# Patient Record
Sex: MALE | Race: WHITE | NOT HISPANIC OR LATINO | URBAN - METROPOLITAN AREA
[De-identification: names, ages, dates, MRNs, and addresses within clinical notes are randomized per-mention and may not be internally consistent; named-entity substitution may affect disease eponyms.]

---

## 2020-09-05 ENCOUNTER — EMERGENCY (EMERGENCY)
Facility: HOSPITAL | Age: 40
LOS: 1 days | Discharge: HOME | End: 2020-09-05
Attending: EMERGENCY MEDICINE
Payer: COMMERCIAL

## 2020-09-05 VITALS
HEART RATE: 113 BPM | WEIGHT: 235.01 LBS | DIASTOLIC BLOOD PRESSURE: 91 MMHG | TEMPERATURE: 98 F | OXYGEN SATURATION: 98 % | SYSTOLIC BLOOD PRESSURE: 136 MMHG | HEIGHT: 72 IN | RESPIRATION RATE: 18 BRPM

## 2020-09-05 VITALS — HEART RATE: 94 BPM

## 2020-09-05 PROCEDURE — 73630 X-RAY EXAM OF FOOT: CPT | Mod: 26,RT

## 2020-09-05 PROCEDURE — 99283 EMERGENCY DEPT VISIT LOW MDM: CPT

## 2020-09-05 PROCEDURE — 73630 X-RAY EXAM OF FOOT: CPT

## 2020-09-05 RX ORDER — CIPROFLOXACIN LACTATE 400MG/40ML
500 VIAL (ML) INTRAVENOUS ONCE
Refills: 0 | Status: COMPLETED | OUTPATIENT
Start: 2020-09-05 | End: 2020-09-05

## 2020-09-05 RX ORDER — IBUPROFEN 200 MG
600 TABLET ORAL ONCE
Refills: 0 | Status: COMPLETED | OUTPATIENT
Start: 2020-09-05 | End: 2020-09-05

## 2020-09-05 RX ORDER — CIPROFLOXACIN LACTATE 400MG/40ML
1 VIAL (ML) INTRAVENOUS
Qty: 14 | Refills: 0
Start: 2020-09-05 | End: 2020-09-11

## 2020-09-05 RX ORDER — IBUPROFEN 200 MG
1 TABLET ORAL
Qty: 15 | Refills: 0
Start: 2020-09-05

## 2020-09-05 RX ADMIN — Medication 600 MILLIGRAM(S): at 22:04

## 2020-09-05 RX ADMIN — Medication 500 MILLIGRAM(S): at 22:04

## 2020-09-05 NOTE — ED PROVIDER NOTE - CARE PROVIDER_API CALL
Tere, Peter J  PODIATRIC MEDICINE AND SURGERY  [address] [place] [address]  Phone: [phone]  Fax: [phone]  Follow Up Time:

## 2020-09-05 NOTE — ED PROVIDER NOTE - OBJECTIVE STATEMENT
39 y.o male Pmh of Dm II on Po medication and HTN presents in ER and C.o right foot pain and redness since 1-2 days ago . states he was working in [employer] and he had wet boot . he is not sure if he step on the nail or due to wet boot  on the sole of the foot.  as the day passed he had some redness around the toes . pain on walking . he took Motrin and one dose of Augmentin at home . denies any fever of chills, . states his sugar under control around 100. states last tetanus was 8 months ago

## 2020-09-05 NOTE — ED ADULT TRIAGE NOTE - CHIEF COMPLAINT QUOTE
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

## 2020-09-05 NOTE — ED PROVIDER NOTE - PROGRESS NOTE DETAILS
xray w.o any acute finding . draw the line at the cellulitis area . start the pt on cipro  f.u pcp and podiatry

## 2020-09-05 NOTE — ED PROVIDER NOTE - ATTENDING CONTRIBUTION TO CARE
Lyn: I performed a face to face bedside interview with patient regarding history of present illness, review of symptoms and past medical history. I completed an independent physical exam.  I have discussed patient's plan of care with advanced care provider.   I agree with note as stated above including HISTORY OF PRESENT ILLNESS, HIV, PAST MEDICAL/SURGICAL/FAMILY/SOCIAL HISTORY, ALLERGIES AND HOME MEDICATIONS, REVIEW OF SYSTEMS, PHYSICAL EXAM, MEDICAL DECISION MAKING and any PROGRESS NOTES during the time I functioned as the attending physician for this patient  unless otherwise noted. My brief assessment is as follows: pt with 1 day right plantar foot pain near distal middle foot. 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 denies f/c, n/v or any other symptoms. on metformin for dm. took one dose augmentin PTA. with small area of likely puncture wound distal foot, minimal surrounding erythema <1cm, ttp. no swelling, neurovasuclarly intact. will treat with cipro given possible nail puncture, return precautions.

## 2020-09-05 NOTE — ED PROVIDER NOTE - MUSCULOSKELETAL, MLM
Spine appears normal, right foot: no bony TTP , no puncture wound noted , on the sole of the foot mid ant foot point TTP , redness at ant foot around the 3,4,5 proximal metatarsal noted , no wound , no Strike redness noted

## 2020-09-05 NOTE — ED PROVIDER NOTE - CLINICAL SUMMARY MEDICAL DECISION MAKING FREE TEXT BOX
39 y.o male Pmh of Dm II on Po medication and HTN presents in ER and C.o right foot pain and redness since 1-2 days ago S.p work with wet boot or ? step on the nail   xray of the foot / TORI- cipro , motrin reeval

## 2020-09-05 NOTE — ED PROVIDER NOTE - PATIENT PORTAL LINK FT
You can access the FollowMyHealth Patient Portal offered by [facility] by registering at the following website: [email or web address]. By joining [facility]’s FollowMyHealth portal, you will also be able to view your health information using other applications (apps) compatible with our system.

## 2020-09-08 ENCOUNTER — EMERGENCY (EMERGENCY)
Facility: HOSPITAL | Age: 40
LOS: 1 days | Discharge: HOME | End: 2020-09-08
Attending: EMERGENCY MEDICINE
Payer: COMMERCIAL

## 2020-09-08 VITALS
HEIGHT: 72 IN | RESPIRATION RATE: 17 BRPM | HEART RATE: 111 BPM | DIASTOLIC BLOOD PRESSURE: 107 MMHG | SYSTOLIC BLOOD PRESSURE: 150 MMHG | TEMPERATURE: 98 F | WEIGHT: 233.91 LBS | OXYGEN SATURATION: 98 %

## 2020-09-08 PROCEDURE — 99283 EMERGENCY DEPT VISIT LOW MDM: CPT

## 2020-09-08 RX ORDER — AZTREONAM 2 G
1 VIAL (EA) INJECTION
Qty: 14 | Refills: 0
Start: 2020-09-08 | End: 2020-09-14

## 2020-09-08 RX ORDER — CEPHALEXIN 500 MG
1 CAPSULE ORAL
Qty: 28 | Refills: 0
Start: 2020-09-08 | End: 2020-09-14

## 2020-09-08 RX ORDER — CEPHALEXIN 500 MG
500 CAPSULE ORAL ONCE
Refills: 0 | Status: COMPLETED | OUTPATIENT
Start: 2020-09-08 | End: 2020-09-08

## 2020-09-08 RX ADMIN — Medication 500 MILLIGRAM(S): at 02:12

## 2020-09-08 RX ADMIN — Medication 1 TABLET(S): at 02:13

## 2020-09-08 NOTE — ED POST DISCHARGE NOTE - RESULT SUMMARY
as per patient never received antibiotics, as per chart RX Keflex and Bactrim sent, informed patient will re-sent medication, if pharmacy does not receive to contact ED directly to speak to pharmacist

## 2020-09-08 NOTE — ED PROVIDER NOTE - ATTENDING CONTRIBUTION TO CARE
I personally saw the patient with the PA, and completed the key components of the history and physical exam. I then discussed the management plan with the PA.   gen in nad resp clear cardiac no murmur abd soft msk skin as doceuemnted by pa   agree with abx switch neurovasc intact   areturn precautions given

## 2020-09-08 NOTE — ED PROVIDER NOTE - CLINICAL SUMMARY MEDICAL DECISION MAKING FREE TEXT BOX
afebrile, will have patient stop cipro, coverage keflex/bactrim for cellulitis, explained to pt if sxs worsen/fevers return to the ed immediately wound check in 48 hours by pmd

## 2020-09-08 NOTE — ED PROVIDER NOTE - OBJECTIVE STATEMENT
pt is a 40 y/o male with a pmhx of diabetes presenting to the ed for evaluation. pt was seen at [facility] two days ago fot right foot pain and redness, pt had puncture wound to bottom of the foot (unsure if nail or different object), then 2 days later noticed redness to top of the foot. pt was prescribed ciprofloxacin. pt looked today at the area and believed that it was spreading prompting visit. pt denies fevers. pt denies any new injuries or trauma. pt denies cp, SOB, abd pain,nausea, vomiting, back pain, numbness or loss of sensation

## 2020-09-08 NOTE — ED ADULT TRIAGE NOTE - CHIEF COMPLAINT QUOTE
PT presents to ED for redness pain and swelling to right foot.  PT states he stepped on something at work that punctured his foot. Seen here saturday DC's with cipro. Redness spreading up top of foot. Pain worsening

## 2020-09-08 NOTE — ED PROVIDER NOTE - PHYSICAL EXAMINATION
Const: Awake, alert and oriented. In no acute distress. Well appearing.  HEENT: NC/AT. Moist mucous membranes.  Eyes: No scleral icterus. EOMI.  Neck:. Soft and supple. Full ROM without pain.  Cardiac: +S1/S2. No murmurs. Peripheral pulses 2+ and symmetric. No LE edema.  Resp: Speaking in full sentences. No evidence of respiratory distress. No wheezes, rales or rhonchi.  Abd: Soft, non-tender, non-distended. Normal bowel sounds in all 4 quadrants. No guarding or rebound.  MSK: FROM in all extremities, neurovascularly intact, DP palpable   Back: Spine midline and non-tender. No CVAT.  Skin: small area of erythema/swelling noted to anterior foot around 3rd, 4th, 5th proximal metartarsal, line drawn from previous visit, small area outside of line, no linear streaking, no signs of nec fas noted soft compartments   Lymph: No cervical lymphadenopathy.  Neuro: Awake, alert & oriented x 3. Moves all extremities symmetrically.

## 2020-09-08 NOTE — ED PROVIDER NOTE - PATIENT PORTAL LINK FT
You can access the FollowMyHealth Patient Portal offered by [facility] by registering at the following website: [email or web address]. By joining [facility]’s FollowMyHealth portal, you will also be able to view your health information using other applications (apps) compatible with our system.

## 2020-09-09 ENCOUNTER — INPATIENT (INPATIENT)
Facility: HOSPITAL | Age: 40
LOS: 5 days | Discharge: HOME | DRG: 854 | End: 2020-09-15
Attending: STUDENT IN AN ORGANIZED HEALTH CARE EDUCATION/TRAINING PROGRAM | Admitting: FAMILY MEDICINE
Payer: COMMERCIAL

## 2020-09-09 VITALS
TEMPERATURE: 98 F | RESPIRATION RATE: 20 BRPM | OXYGEN SATURATION: 99 % | SYSTOLIC BLOOD PRESSURE: 174 MMHG | HEART RATE: 119 BPM | DIASTOLIC BLOOD PRESSURE: 111 MMHG | HEIGHT: 72 IN | WEIGHT: 227.96 LBS

## 2020-09-09 DIAGNOSIS — A41.9 SEPSIS, UNSPECIFIED ORGANISM: ICD-10-CM

## 2020-09-09 DIAGNOSIS — L03.818 CELLULITIS OF OTHER SITES: ICD-10-CM

## 2020-09-09 DIAGNOSIS — Z90.89 ACQUIRED ABSENCE OF OTHER ORGANS: Chronic | ICD-10-CM

## 2020-09-09 DIAGNOSIS — L03.119 CELLULITIS OF UNSPECIFIED PART OF LIMB: ICD-10-CM

## 2020-09-09 DIAGNOSIS — E11.65 TYPE 2 DIABETES MELLITUS WITH HYPERGLYCEMIA: ICD-10-CM

## 2020-09-09 LAB
ALBUMIN SERPL ELPH-MCNC: 4.4 G/DL — SIGNIFICANT CHANGE UP (ref 3.3–5.2)
ALP SERPL-CCNC: 99 U/L — SIGNIFICANT CHANGE UP (ref 40–120)
ALT FLD-CCNC: 21 U/L — SIGNIFICANT CHANGE UP
ANION GAP SERPL CALC-SCNC: 18 MMOL/L — HIGH (ref 5–17)
APTT BLD: 30.7 SEC — SIGNIFICANT CHANGE UP (ref 27.5–35.5)
AST SERPL-CCNC: 31 U/L — SIGNIFICANT CHANGE UP
BASOPHILS # BLD AUTO: 0.04 K/UL — SIGNIFICANT CHANGE UP (ref 0–0.2)
BASOPHILS NFR BLD AUTO: 0.2 % — SIGNIFICANT CHANGE UP (ref 0–2)
BILIRUB SERPL-MCNC: 0.6 MG/DL — SIGNIFICANT CHANGE UP (ref 0.4–2)
BUN SERPL-MCNC: 11 MG/DL — SIGNIFICANT CHANGE UP (ref 8–20)
CALCIUM SERPL-MCNC: 9.7 MG/DL — SIGNIFICANT CHANGE UP (ref 8.6–10.2)
CHLORIDE SERPL-SCNC: 93 MMOL/L — LOW (ref 98–107)
CO2 SERPL-SCNC: 23 MMOL/L — SIGNIFICANT CHANGE UP (ref 22–29)
CREAT SERPL-MCNC: 0.76 MG/DL — SIGNIFICANT CHANGE UP (ref 0.5–1.3)
EOSINOPHIL # BLD AUTO: 0.04 K/UL — SIGNIFICANT CHANGE UP (ref 0–0.5)
EOSINOPHIL NFR BLD AUTO: 0.2 % — SIGNIFICANT CHANGE UP (ref 0–6)
GLUCOSE BLDC GLUCOMTR-MCNC: 286 MG/DL — HIGH (ref 70–99)
GLUCOSE SERPL-MCNC: 341 MG/DL — HIGH (ref 70–99)
HCT VFR BLD CALC: 46.7 % — SIGNIFICANT CHANGE UP (ref 39–50)
HGB BLD-MCNC: 16 G/DL — SIGNIFICANT CHANGE UP (ref 13–17)
IMM GRANULOCYTES NFR BLD AUTO: 0.4 % — SIGNIFICANT CHANGE UP (ref 0–1.5)
INR BLD: 0.98 RATIO — SIGNIFICANT CHANGE UP (ref 0.88–1.16)
LACTATE BLDV-MCNC: 1.7 MMOL/L — SIGNIFICANT CHANGE UP (ref 0.5–2)
LYMPHOCYTES # BLD AUTO: 12.7 % — LOW (ref 13–44)
LYMPHOCYTES # BLD AUTO: 2.09 K/UL — SIGNIFICANT CHANGE UP (ref 1–3.3)
MCHC RBC-ENTMCNC: 30 PG — SIGNIFICANT CHANGE UP (ref 27–34)
MCHC RBC-ENTMCNC: 34.3 GM/DL — SIGNIFICANT CHANGE UP (ref 32–36)
MCV RBC AUTO: 87.6 FL — SIGNIFICANT CHANGE UP (ref 80–100)
MONOCYTES # BLD AUTO: 0.97 K/UL — HIGH (ref 0–0.9)
MONOCYTES NFR BLD AUTO: 5.9 % — SIGNIFICANT CHANGE UP (ref 2–14)
NEUTROPHILS # BLD AUTO: 13.21 K/UL — HIGH (ref 1.8–7.4)
NEUTROPHILS NFR BLD AUTO: 80.6 % — HIGH (ref 43–77)
PLATELET # BLD AUTO: 228 K/UL — SIGNIFICANT CHANGE UP (ref 150–400)
POTASSIUM SERPL-MCNC: 3.5 MMOL/L — SIGNIFICANT CHANGE UP (ref 3.5–5.3)
POTASSIUM SERPL-SCNC: 3.5 MMOL/L — SIGNIFICANT CHANGE UP (ref 3.5–5.3)
PROT SERPL-MCNC: 8.4 G/DL — SIGNIFICANT CHANGE UP (ref 6.6–8.7)
PROTHROM AB SERPL-ACNC: 11.4 SEC — SIGNIFICANT CHANGE UP (ref 10.6–13.6)
RBC # BLD: 5.33 M/UL — SIGNIFICANT CHANGE UP (ref 4.2–5.8)
RBC # FLD: 11.8 % — SIGNIFICANT CHANGE UP (ref 10.3–14.5)
SARS-COV-2 RNA SPEC QL NAA+PROBE: SIGNIFICANT CHANGE UP
SODIUM SERPL-SCNC: 134 MMOL/L — LOW (ref 135–145)
WBC # BLD: 16.41 K/UL — HIGH (ref 3.8–10.5)
WBC # FLD AUTO: 16.41 K/UL — HIGH (ref 3.8–10.5)

## 2020-09-09 PROCEDURE — 93971 EXTREMITY STUDY: CPT | Mod: 26,RT

## 2020-09-09 PROCEDURE — 99285 EMERGENCY DEPT VISIT HI MDM: CPT

## 2020-09-09 PROCEDURE — 93010 ELECTROCARDIOGRAM REPORT: CPT

## 2020-09-09 PROCEDURE — 73630 X-RAY EXAM OF FOOT: CPT | Mod: 26,RT

## 2020-09-09 RX ORDER — DEXTROSE 50 % IN WATER 50 %
25 SYRINGE (ML) INTRAVENOUS ONCE
Refills: 0 | Status: DISCONTINUED | OUTPATIENT
Start: 2020-09-09 | End: 2020-09-15

## 2020-09-09 RX ORDER — PIPERACILLIN AND TAZOBACTAM 4; .5 G/20ML; G/20ML
3.38 INJECTION, POWDER, LYOPHILIZED, FOR SOLUTION INTRAVENOUS EVERY 8 HOURS
Refills: 0 | Status: DISCONTINUED | OUTPATIENT
Start: 2020-09-09 | End: 2020-09-15

## 2020-09-09 RX ORDER — ACETAMINOPHEN 500 MG
650 TABLET ORAL ONCE
Refills: 0 | Status: COMPLETED | OUTPATIENT
Start: 2020-09-09 | End: 2020-09-09

## 2020-09-09 RX ORDER — ACETAMINOPHEN 500 MG
650 TABLET ORAL EVERY 6 HOURS
Refills: 0 | Status: DISCONTINUED | OUTPATIENT
Start: 2020-09-09 | End: 2020-09-15

## 2020-09-09 RX ORDER — GLUCAGON INJECTION, SOLUTION 0.5 MG/.1ML
1 INJECTION, SOLUTION SUBCUTANEOUS ONCE
Refills: 0 | Status: DISCONTINUED | OUTPATIENT
Start: 2020-09-09 | End: 2020-09-15

## 2020-09-09 RX ORDER — OXYCODONE HYDROCHLORIDE 5 MG/1
5 TABLET ORAL EVERY 12 HOURS
Refills: 0 | Status: DISCONTINUED | OUTPATIENT
Start: 2020-09-09 | End: 2020-09-14

## 2020-09-09 RX ORDER — OXYCODONE HYDROCHLORIDE 5 MG/1
5 TABLET ORAL ONCE
Refills: 0 | Status: DISCONTINUED | OUTPATIENT
Start: 2020-09-09 | End: 2020-09-09

## 2020-09-09 RX ORDER — ENOXAPARIN SODIUM 100 MG/ML
40 INJECTION SUBCUTANEOUS DAILY
Refills: 0 | Status: DISCONTINUED | OUTPATIENT
Start: 2020-09-09 | End: 2020-09-15

## 2020-09-09 RX ORDER — VANCOMYCIN HCL 1 G
1250 VIAL (EA) INTRAVENOUS EVERY 12 HOURS
Refills: 0 | Status: DISCONTINUED | OUTPATIENT
Start: 2020-09-09 | End: 2020-09-09

## 2020-09-09 RX ORDER — INSULIN LISPRO 100/ML
VIAL (ML) SUBCUTANEOUS AT BEDTIME
Refills: 0 | Status: DISCONTINUED | OUTPATIENT
Start: 2020-09-09 | End: 2020-09-15

## 2020-09-09 RX ORDER — SODIUM CHLORIDE 9 MG/ML
2400 INJECTION INTRAMUSCULAR; INTRAVENOUS; SUBCUTANEOUS ONCE
Refills: 0 | Status: COMPLETED | OUTPATIENT
Start: 2020-09-09 | End: 2020-09-09

## 2020-09-09 RX ORDER — PIPERACILLIN AND TAZOBACTAM 4; .5 G/20ML; G/20ML
3.38 INJECTION, POWDER, LYOPHILIZED, FOR SOLUTION INTRAVENOUS ONCE
Refills: 0 | Status: COMPLETED | OUTPATIENT
Start: 2020-09-09 | End: 2020-09-09

## 2020-09-09 RX ORDER — SODIUM CHLORIDE 9 MG/ML
1000 INJECTION, SOLUTION INTRAVENOUS
Refills: 0 | Status: DISCONTINUED | OUTPATIENT
Start: 2020-09-09 | End: 2020-09-15

## 2020-09-09 RX ORDER — VANCOMYCIN HCL 1 G
1000 VIAL (EA) INTRAVENOUS EVERY 8 HOURS
Refills: 0 | Status: DISCONTINUED | OUTPATIENT
Start: 2020-09-09 | End: 2020-09-10

## 2020-09-09 RX ORDER — SODIUM CHLORIDE 9 MG/ML
1000 INJECTION INTRAMUSCULAR; INTRAVENOUS; SUBCUTANEOUS
Refills: 0 | Status: COMPLETED | OUTPATIENT
Start: 2020-09-09 | End: 2020-09-09

## 2020-09-09 RX ORDER — LACTOBACILLUS ACIDOPHILUS 100MM CELL
1 CAPSULE ORAL
Refills: 0 | Status: DISCONTINUED | OUTPATIENT
Start: 2020-09-09 | End: 2020-09-10

## 2020-09-09 RX ORDER — VANCOMYCIN HCL 1 G
1000 VIAL (EA) INTRAVENOUS ONCE
Refills: 0 | Status: COMPLETED | OUTPATIENT
Start: 2020-09-09 | End: 2020-09-09

## 2020-09-09 RX ORDER — DEXTROSE 50 % IN WATER 50 %
15 SYRINGE (ML) INTRAVENOUS ONCE
Refills: 0 | Status: DISCONTINUED | OUTPATIENT
Start: 2020-09-09 | End: 2020-09-15

## 2020-09-09 RX ORDER — DEXTROSE 50 % IN WATER 50 %
12.5 SYRINGE (ML) INTRAVENOUS ONCE
Refills: 0 | Status: DISCONTINUED | OUTPATIENT
Start: 2020-09-09 | End: 2020-09-15

## 2020-09-09 RX ORDER — INSULIN LISPRO 100/ML
VIAL (ML) SUBCUTANEOUS
Refills: 0 | Status: DISCONTINUED | OUTPATIENT
Start: 2020-09-09 | End: 2020-09-15

## 2020-09-09 RX ADMIN — Medication 250 MILLIGRAM(S): at 19:01

## 2020-09-09 RX ADMIN — SODIUM CHLORIDE 2400 MILLILITER(S): 9 INJECTION INTRAMUSCULAR; INTRAVENOUS; SUBCUTANEOUS at 16:07

## 2020-09-09 RX ADMIN — SODIUM CHLORIDE 125 MILLILITER(S): 9 INJECTION INTRAMUSCULAR; INTRAVENOUS; SUBCUTANEOUS at 21:43

## 2020-09-09 RX ADMIN — SODIUM CHLORIDE 2400 MILLILITER(S): 9 INJECTION INTRAMUSCULAR; INTRAVENOUS; SUBCUTANEOUS at 17:10

## 2020-09-09 RX ADMIN — OXYCODONE HYDROCHLORIDE 5 MILLIGRAM(S): 5 TABLET ORAL at 20:25

## 2020-09-09 RX ADMIN — Medication 650 MILLIGRAM(S): at 17:12

## 2020-09-09 RX ADMIN — PIPERACILLIN AND TAZOBACTAM 25 GRAM(S): 4; .5 INJECTION, POWDER, LYOPHILIZED, FOR SOLUTION INTRAVENOUS at 21:43

## 2020-09-09 RX ADMIN — PIPERACILLIN AND TAZOBACTAM 200 GRAM(S): 4; .5 INJECTION, POWDER, LYOPHILIZED, FOR SOLUTION INTRAVENOUS at 17:20

## 2020-09-09 NOTE — ED ADULT NURSE NOTE - INTERVENTIONS DEFINITIONS
[place] to call system/Stretcher in lowest position, wheels locked, appropriate side rails in place/Monitor gait and stability/Review medications for side effects contributing to fall risk/Monitor for mental status changes and reorient to person, place, and time

## 2020-09-09 NOTE — H&P ADULT - NSHPPHYSICALEXAM_GEN_ALL_CORE
General: Well developed,  male lying in bed not in distress.  HEENT: AT, NC. PERRL. intact EOM. no throat erythema or exudate.   Neck: supple. no JVD.   Chest: CTA bilaterally  Heart: S1,S2. RRR. no heart murmur. no edema  Abdomen: soft. non-tender. non-distended. + BS.   Ext: no calf tenderness on either side. distal pulses 2 +, sensory intact.   Neuro: AAO x3. no focal weakness. no speech disorder. all CNs intact, reflexes 2 + b/L.   Skin: rt. foot dorsal aspect has an erythematous , swollen, warm and tender area extending from 3rd to 5th toe towards lower ankle area covering lateral half of the rt. foot. no fluctuation, no open wound. over plantar aspect no open wound noted, no discharge.   Psych : normal affect.

## 2020-09-09 NOTE — ED ADULT NURSE NOTE - OBJECTIVE STATEMENT
Assumed pt care @1700. Pt received A&Ox3 c/o R foot pain that began Assumed pt care @1700. JASMYN Faye @ bedside. Pt received A&Ox3 c/o R foot pain that began Friday and R lower toothache that began last night. Pt states he stepped on an unknown object in R foot approx a week ago, no puncture wound present. As per patient, tetanus up to date. Pt c/o of chills and 6/10 pain at this time. Pt states he came to ED Friday night and was started on Cipro. Pt states he was compliant w/antibiotics regimen. Pt states the foot pain began to worsen and swelling increased. Pt came back to ED on Sunday and switched to two different antibiotics, unsure of names. Pt states pain became unbearable and chills began last night. Pt placed on  bpm in sinus tach w/. Breathing even and unlabored. NAD. Pt given pain meds as per MD order. Patient given call bell and made aware of plan of care.

## 2020-09-09 NOTE — ED ADULT TRIAGE NOTE - CHIEF COMPLAINT QUOTE
Pt arrives with c/o R foot cellulitis that is increasing past border and was told to come back if so. Pt c/o chills/fevers. Pt also with R lower toothache. Pt medicated with IBU PTA.

## 2020-09-09 NOTE — ED PROVIDER NOTE - OBJECTIVE STATEMENT
38 y/o M with PMHx DM presents to ED c/o right foot cellulitis that is worsening over past 6 days. Pt was seen at [facility] ER twice over past several days, was initially Rx'd Cipro which was then changed to Bactrim/Keflex which he reports compliance with. Pt states cellulitic area is worsening. Reports chills this morning, took motrin 4 hours prior to arrival. Also reports R sided toothache. Pt has healing abrasions to RLE from scrapes at his job.   Last known TDAP within 1 year  Soc: Former smoker, denies IVDU

## 2020-09-09 NOTE — ED PROVIDER NOTE - CLINICAL SUMMARY MEDICAL DECISION MAKING FREE TEXT BOX
38 y/o M with PMHx DM presents to ED c/o right foot cellulitis that is worsening over past 6 days.  -Will check XR foot, US duplex, labs, abx and tylenol for pain.

## 2020-09-09 NOTE — H&P ADULT - HISTORY OF PRESENT ILLNESS
pt. is a 38 y/o male with h/o DM presents to ER for rt. foot pain, redness and warmth that is worsening over past 6 -7  days. Pt was seen at [facility] ER twice over past several days, was initially given Cipro which was then changed to Bactrim/Keflex which he has used but did not get better and cellulitic area is worsening . Reports chills this morning, took motrin 4 hours prior to arrival. 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  no cp, no sob, no abd. pain.  no n/v/d. Last known TDAP within 1 year

## 2020-09-09 NOTE — H&P ADULT - PROBLEM SELECTOR PLAN 2
will keep on humalog scale, close f/u on BG. wbc [id number], Hr 119, obvious source is foot cellulitis, vanco, zosyn on board, follow cx, iv fluids.

## 2020-09-09 NOTE — H&P ADULT - PROBLEM SELECTOR PROBLEM 2
Type 2 diabetes mellitus with hyperglycemia, without long-term current use of insulin Sepsis without acute organ dysfunction, due to unspecified organism

## 2020-09-09 NOTE — ED PROVIDER NOTE - ATTENDING CONTRIBUTION TO CARE
I, Elias Roach, have personally performed a face to face diagnostic evaluation on this patient. I have reviewed the ACP note and agree with the history, exam and plan of care, except as noted.    38 yo M hx DM and right foot cellulitis. patient was on multiple abx and kept switching over. symptoms not improving. Right foot cellulitis with tracking up the leg and edema. xray negative for free air. ID consulted, agree with rivas and zosyn. Patient admitted for foot cellulitis.

## 2020-09-09 NOTE — ED PROVIDER NOTE - PROGRESS NOTE DETAILS
JASMYN Faye NOTE: I spoke to ID Dr. Cody who recommends Vanco, Zosyn and admission. I also spoke to podiatry resident who will come and evaluate the patient. JASMYN Faye NOTE: Pt agreeable to admission. Medicine team accepted admission, all further care and disposition transferred to medicine team.

## 2020-09-10 LAB
A1C WITH ESTIMATED AVERAGE GLUCOSE RESULT: 12.6 % — HIGH (ref 4–5.6)
ANION GAP SERPL CALC-SCNC: 14 MMOL/L — SIGNIFICANT CHANGE UP (ref 5–17)
BASOPHILS # BLD AUTO: 0.06 K/UL — SIGNIFICANT CHANGE UP (ref 0–0.2)
BASOPHILS NFR BLD AUTO: 0.5 % — SIGNIFICANT CHANGE UP (ref 0–2)
BUN SERPL-MCNC: 9 MG/DL — SIGNIFICANT CHANGE UP (ref 8–20)
CALCIUM SERPL-MCNC: 9 MG/DL — SIGNIFICANT CHANGE UP (ref 8.6–10.2)
CHLORIDE SERPL-SCNC: 101 MMOL/L — SIGNIFICANT CHANGE UP (ref 98–107)
CO2 SERPL-SCNC: 22 MMOL/L — SIGNIFICANT CHANGE UP (ref 22–29)
CREAT SERPL-MCNC: 0.55 MG/DL — SIGNIFICANT CHANGE UP (ref 0.5–1.3)
EOSINOPHIL # BLD AUTO: 0.12 K/UL — SIGNIFICANT CHANGE UP (ref 0–0.5)
EOSINOPHIL NFR BLD AUTO: 0.9 % — SIGNIFICANT CHANGE UP (ref 0–6)
ESTIMATED AVERAGE GLUCOSE: 315 MG/DL — HIGH (ref 68–114)
GLUCOSE BLDC GLUCOMTR-MCNC: 144 MG/DL — HIGH (ref 70–99)
GLUCOSE BLDC GLUCOMTR-MCNC: 162 MG/DL — HIGH (ref 70–99)
GLUCOSE BLDC GLUCOMTR-MCNC: 180 MG/DL — HIGH (ref 70–99)
GLUCOSE BLDC GLUCOMTR-MCNC: 211 MG/DL — HIGH (ref 70–99)
GLUCOSE SERPL-MCNC: 186 MG/DL — HIGH (ref 70–99)
HCT VFR BLD CALC: 42.4 % — SIGNIFICANT CHANGE UP (ref 39–50)
HGB BLD-MCNC: 14.5 G/DL — SIGNIFICANT CHANGE UP (ref 13–17)
IMM GRANULOCYTES NFR BLD AUTO: 0.2 % — SIGNIFICANT CHANGE UP (ref 0–1.5)
LYMPHOCYTES # BLD AUTO: 1.68 K/UL — SIGNIFICANT CHANGE UP (ref 1–3.3)
LYMPHOCYTES # BLD AUTO: 13 % — SIGNIFICANT CHANGE UP (ref 13–44)
MCHC RBC-ENTMCNC: 29.9 PG — SIGNIFICANT CHANGE UP (ref 27–34)
MCHC RBC-ENTMCNC: 34.2 GM/DL — SIGNIFICANT CHANGE UP (ref 32–36)
MCV RBC AUTO: 87.4 FL — SIGNIFICANT CHANGE UP (ref 80–100)
MONOCYTES # BLD AUTO: 0.93 K/UL — HIGH (ref 0–0.9)
MONOCYTES NFR BLD AUTO: 7.2 % — SIGNIFICANT CHANGE UP (ref 2–14)
NEUTROPHILS # BLD AUTO: 10.11 K/UL — HIGH (ref 1.8–7.4)
NEUTROPHILS NFR BLD AUTO: 78.2 % — HIGH (ref 43–77)
PLATELET # BLD AUTO: 219 K/UL — SIGNIFICANT CHANGE UP (ref 150–400)
POTASSIUM SERPL-MCNC: 3.7 MMOL/L — SIGNIFICANT CHANGE UP (ref 3.5–5.3)
POTASSIUM SERPL-SCNC: 3.7 MMOL/L — SIGNIFICANT CHANGE UP (ref 3.5–5.3)
RBC # BLD: 4.85 M/UL — SIGNIFICANT CHANGE UP (ref 4.2–5.8)
RBC # FLD: 11.8 % — SIGNIFICANT CHANGE UP (ref 10.3–14.5)
SODIUM SERPL-SCNC: 137 MMOL/L — SIGNIFICANT CHANGE UP (ref 135–145)
VANCOMYCIN TROUGH SERPL-MCNC: 4.7 UG/ML — LOW (ref 10–20)
WBC # BLD: 12.93 K/UL — HIGH (ref 3.8–10.5)
WBC # FLD AUTO: 12.93 K/UL — HIGH (ref 3.8–10.5)

## 2020-09-10 PROCEDURE — 99232 SBSQ HOSP IP/OBS MODERATE 35: CPT

## 2020-09-10 PROCEDURE — 99233 SBSQ HOSP IP/OBS HIGH 50: CPT

## 2020-09-10 RX ORDER — INSULIN GLARGINE 100 [IU]/ML
10 INJECTION, SOLUTION SUBCUTANEOUS AT BEDTIME
Refills: 0 | Status: DISCONTINUED | OUTPATIENT
Start: 2020-09-10 | End: 2020-09-12

## 2020-09-10 RX ORDER — SACCHAROMYCES BOULARDII 250 MG
250 POWDER IN PACKET (EA) ORAL
Refills: 0 | Status: DISCONTINUED | OUTPATIENT
Start: 2020-09-10 | End: 2020-09-15

## 2020-09-10 RX ORDER — VANCOMYCIN HCL 1 G
1250 VIAL (EA) INTRAVENOUS EVERY 8 HOURS
Refills: 0 | Status: DISCONTINUED | OUTPATIENT
Start: 2020-09-10 | End: 2020-09-12

## 2020-09-10 RX ORDER — INSULIN GLARGINE 100 [IU]/ML
8 INJECTION, SOLUTION SUBCUTANEOUS AT BEDTIME
Refills: 0 | Status: DISCONTINUED | OUTPATIENT
Start: 2020-09-10 | End: 2020-09-10

## 2020-09-10 RX ORDER — INFLUENZA VIRUS VACCINE 15; 15; 15; 15 UG/.5ML; UG/.5ML; UG/.5ML; UG/.5ML
0.5 SUSPENSION INTRAMUSCULAR ONCE
Refills: 0 | Status: COMPLETED | OUTPATIENT
Start: 2020-09-10 | End: 2020-09-13

## 2020-09-10 RX ORDER — INSULIN LISPRO 100/ML
3 VIAL (ML) SUBCUTANEOUS
Refills: 0 | Status: DISCONTINUED | OUTPATIENT
Start: 2020-09-10 | End: 2020-09-12

## 2020-09-10 RX ADMIN — Medication 3 UNIT(S): at 16:21

## 2020-09-10 RX ADMIN — ENOXAPARIN SODIUM 40 MILLIGRAM(S): 100 INJECTION SUBCUTANEOUS at 11:21

## 2020-09-10 RX ADMIN — Medication 3 UNIT(S): at 12:12

## 2020-09-10 RX ADMIN — Medication 250 MILLIGRAM(S): at 16:22

## 2020-09-10 RX ADMIN — OXYCODONE HYDROCHLORIDE 5 MILLIGRAM(S): 5 TABLET ORAL at 06:38

## 2020-09-10 RX ADMIN — OXYCODONE HYDROCHLORIDE 5 MILLIGRAM(S): 5 TABLET ORAL at 07:08

## 2020-09-10 RX ADMIN — PIPERACILLIN AND TAZOBACTAM 25 GRAM(S): 4; .5 INJECTION, POWDER, LYOPHILIZED, FOR SOLUTION INTRAVENOUS at 13:34

## 2020-09-10 RX ADMIN — Medication 250 MILLIGRAM(S): at 02:41

## 2020-09-10 RX ADMIN — PIPERACILLIN AND TAZOBACTAM 25 GRAM(S): 4; .5 INJECTION, POWDER, LYOPHILIZED, FOR SOLUTION INTRAVENOUS at 22:31

## 2020-09-10 RX ADMIN — Medication 2: at 16:20

## 2020-09-10 RX ADMIN — OXYCODONE HYDROCHLORIDE 5 MILLIGRAM(S): 5 TABLET ORAL at 22:59

## 2020-09-10 RX ADMIN — Medication 650 MILLIGRAM(S): at 11:21

## 2020-09-10 RX ADMIN — OXYCODONE HYDROCHLORIDE 5 MILLIGRAM(S): 5 TABLET ORAL at 22:28

## 2020-09-10 RX ADMIN — Medication 166.67 MILLIGRAM(S): at 22:28

## 2020-09-10 RX ADMIN — Medication 4: at 07:47

## 2020-09-10 RX ADMIN — Medication 250 MILLIGRAM(S): at 11:21

## 2020-09-10 RX ADMIN — INSULIN GLARGINE 10 UNIT(S): 100 INJECTION, SOLUTION SUBCUTANEOUS at 22:28

## 2020-09-10 RX ADMIN — Medication 650 MILLIGRAM(S): at 12:13

## 2020-09-10 RX ADMIN — PIPERACILLIN AND TAZOBACTAM 25 GRAM(S): 4; .5 INJECTION, POWDER, LYOPHILIZED, FOR SOLUTION INTRAVENOUS at 06:36

## 2020-09-10 NOTE — CONSULT NOTE ADULT - SUBJECTIVE AND OBJECTIVE BOX
[facility]  INFECTIOUS DISEASES AND INTERNAL MEDICINE at [place]  =======================================================  Korey Del Valle MD  Diplomates American Board of Internal Medicine and Infectious Diseases  Tel  [phone]  Fax [phone]  =======================================================    [facility]-[id number]  GABRIEL SCHMIDT   HPI:  pt. is a 40 y/o male with DM, not compliant with therapy for several months, presents to ER for rt. foot pain, redness and warmth that is worsening over past 6 -7  days. Pt was seen at [facility] ER twice over past several days, was initially given Cipro which was then changed to Bactrim/Keflex which he has used but did not get better and cellulitic area is worsening . Reports chills this morning, took motrin 4 hours prior to arrival. 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  no cp, no sob, no abd. pain.  no n/v/d. Last known TDAP within 1 year (09 Sep 2020 19:30)    no clear puncture wound.    labs reviewed, A1c of 12.6,   Less pain in the right foot today    I have personally reviewed the labs and data; pertinent labs and data are listed in this note; please see below.   =======================================================  Past Medical & Surgical Hx:  =====================  PAST MEDICAL & SURGICAL HISTORY:  Diabetes  S/P tonsillectomy    Problem List:  ==========  HEALTH ISSUES - PROBLEM Dx:  Sepsis without acute organ dysfunction, due to unspecified organism: Sepsis without acute organ dysfunction, due to unspecified organism  Type 2 diabetes mellitus with hyperglycemia, without long-term current use of insulin: Type 2 diabetes mellitus with hyperglycemia, without long-term current use of insulin  Cellulitis of other specified site: Cellulitis of other specified site      Social Hx:  =======  marijuana cigarettes 3 times a week  former smoker, quit 11/2019    FAMILY HISTORY:  Family history of diabetes mellitus (DM): father  no significant family history of immunosuppressive disorders in mother or father   =======================================================  REVIEW OF SYSTEMS:  CONSTITUTIONAL:  No Fever or chills  HEENT:  No diplopia or blurred vision.  No earache, sore throat or runny nose.  CARDIOVASCULAR:  No pressure, squeezing, strangling, tightness, heaviness or aching about the chest, neck, axilla or epigastrium.  RESPIRATORY:  No cough, shortness of breath  GASTROINTESTINAL:  No nausea, vomiting or diarrhea.  GENITOURINARY:  No dysuria, frequency or urgency. No Blood in urine  MUSCULOSKELETAL:  no joint aches, no muscle pain  SKIN:  as per HPI  NEUROLOGIC:  No Headaches, seizures or weakness.  PSYCHIATRIC:  No disorder of thought or mood.  ENDOCRINE:  No heat or cold intolerance  HEMATOLOGICAL:  No easy bruising or bleeding.   =======================================================  Allergies  No Known Allergies      Antibiotics:  piperacillin/tazobactam IVPB.. 3.375 Gram(s) IV Intermittent every 8 hours  vancomycin  IVPB 1000 milliGRAM(s) IV Intermittent every 8 hours    Other medications:  dextrose 5%. 1000 milliLiter(s) IV Continuous <Continuous>  dextrose 50% Injectable 12.5 Gram(s) IV Push once  dextrose 50% Injectable 25 Gram(s) IV Push once  dextrose 50% Injectable 25 Gram(s) IV Push once  enoxaparin Injectable 40 milliGRAM(s) SubCutaneous daily  influenza   Vaccine 0.5 milliLiter(s) IntraMuscular once  insulin glargine Injectable (LANTUS) 8 Unit(s) SubCutaneous at bedtime  insulin lispro (HumaLOG) corrective regimen sliding scale   SubCutaneous three times a day before meals  insulin lispro (HumaLOG) corrective regimen sliding scale   SubCutaneous at bedtime  insulin lispro Injectable (HumaLOG) 3 Unit(s) SubCutaneous three times a day before meals  saccharomyces boulardii 250 milliGRAM(s) Oral two times a day     cephalexin   500 milliGRAM(s) Oral (09-08-20 @ 02:12)    ciprofloxacin     Tablet   500 milliGRAM(s) Oral (09-05-20 @ 22:04)    piperacillin/tazobactam IVPB.   200 mL/Hr IV Intermittent (09-09-20 @ 17:20)    piperacillin/tazobactam IVPB..   25 mL/Hr IV Intermittent (09-09-20 @ 21:43)   25 mL/Hr IV Intermittent (09-10-20 @ 06:36)    trimethoprim  160 mG/sulfamethoxazole 800 mG   1 Tablet(s) Oral (09-08-20 @ 02:13)    vancomycin  IVPB   250 mL/Hr IV Intermittent (09-09-20 @ 19:01)    vancomycin  IVPB   250 mL/Hr IV Intermittent (09-10-20 @ 02:41)      ======================================================  Physical Exam:  ============  T(F): 98.1 (10 Sep 2020 09:00), Max: 99 (09 Sep 2020 16:41)  HR: 98 (10 Sep 2020 09:00)  BP: 136/93 (10 Sep 2020 09:00)  RR: 18 (10 Sep 2020 09:00)  SpO2: 97% (10 Sep 2020 00:32) (97% - 99%)  temp max in last 48H T(F): , Max: 99 (09-09-20 @ 16:41)Height (cm): 182.88 (09-09-20 @ 14:18)  Weight (kg): 103.4 (09-09-20 @ 14:18)  BMI (kg/m2): 30.9 (09-09-20 @ 14:18)  BSA (m2): 2.25 (09-09-20 @ 14:18)    General:  No acute distress.  Eye: Pupils are equal, round and reactive to light, Extraocular movements are intact, Normal conjunctiva.  HENT: Normocephalic, Oral mucosa is moist, No pharyngeal erythema, No sinus tenderness.  Neck: Supple, No lymphadenopathy.  Respiratory: Lungs are clear to auscultation, Respirations are non-labored.  Cardiovascular: Normal rate, Regular rhythm,   Gastrointestinal: Soft, Non-tender, Non-distended, Normal bowel sounds.  Genitourinary: No costovertebral angle tenderness.  Lymphatics: No lymphadenopathy neck,   Musculoskeletal: Normal range of motion, Normal strength.  Integumentary:   RIGHT FOOT dorsum with erythema,  RIGHT #4 #5 toes, with macerated skin at flexor surface  Neurologic: Alert, Oriented, No focal deficits, Cranial Nerves II-XII are grossly intact.  Psychiatric: Appropriate mood & affect.    =======================================================  Labs:                        14.5   12.93 )-----------( 219      ( 10 Sep 2020 08:10 )             42.4      09-10    137  |  101  |  9.0  ----------------------------<  186<H>  3.7   |  22.0  |  0.55    Ca    9.0      10 Sep 2020 08:10    TPro  8.4  /  Alb  4.4  /  TBili  0.6  /  DBili  x   /  AST  31  /  ALT  21  /  AlkPhos  99  09-09      Creatinine, Serum: 0.55 mg/dL (09-10-20 @ 08:10)  Creatinine, Serum: 0.76 mg/dL (09-09-20 @ 17:49)            COVID-19 PCR: NotDetec (09-09-20 @ 19:23)      WBC Count: 12.93 K/uL (09-10-20 @ 08:10)  WBC Count: 16.41 K/uL (09-09-20 @ 17:49)

## 2020-09-10 NOTE — PROGRESS NOTE ADULT - ASSESSMENT
38 yo Male  with hx of DM on oral meds presented to the ED for worsening foot infection failed outpatient oral antibiotics.    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, has crack in between toes  XR unremarkable and no dvt on duplex  plan:  - continue broad spectrum abx Vanco and Zosyn  -  appreciated ID and Podiatry eval  - Podiatry has done bed side cleaning and sent cultures, will follow  - blood cx pending  - monitor cbc  - prn pain control Tylenol and oxycodone for foot pain    Leukocytosis due to sepsis and cellulitis    Uncontrolled DM type 2  on oral meds but not compliant  a1c 12%  - start lantus 10 units at bedtime and 3 units premeal, will adjust  - ISS and hypoglycemic protocol  - Diabetes educator    DVT ppx: Lovenox  Dispo: remain inpatient likely 2-3 days on IV abx pending cultures  Fullcode

## 2020-09-10 NOTE — ADVANCED PRACTICE NURSE CONSULT - RECOMMEDATIONS
continue diabetes self management education  pt to inject all inuslin doses using pre filled inuslin syringe and rn supervision  glucometer teaching  cc- pls set home care pt is new to insulin continue diabetes self management education  pt to inject all inuslin doses using pre filled inuslin syringe and rn supervision  glucometer teaching  pt is covered for lantus and humalog pens  e pt is new to insulin

## 2020-09-10 NOTE — CONSULT NOTE ADULT - SUBJECTIVE AND OBJECTIVE BOX
Patient is a 39y old  Male who presents with a chief complaint of rt. foot cellulitis (10 Sep 2020 12:18)      HPI:  pt. is a 40 y/o male with h/o DM presents to ER for rt. foot pain, redness and warmth that is worsening over past 6 -7  days. Pt was seen at [facility] ER twice over past several days, was initially given Cipro which was then changed to Bactrim/Keflex which he has used but did not get better and cellulitic area is worsening . Reports chills this morning, took motrin 4 hours prior to arrival. pt. works in [employer] business and thinks likely stepped on something about 1 week ago and later his foot got redness, warmth and swelling.  no cp, no sob, no abd. pain.  no n/v/d. Last known TDAP within 1 year (09 Sep 2020 19:30)      Podiatry HPI: History as above. 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 He was previous discharged from ED with oral antibiotics however the redness worsened with increased pain to the right foot. Patient does not follow a podiatrist outpatient. Patient endorses tingling sensation in toes. Denies f/c/n/v or SOB.     PMH: Diabetes    Allergies: No Known Allergies    Medications: acetaminophen   Tablet .. 650 milliGRAM(s) Oral every 6 hours PRN  dextrose 40% Gel 15 Gram(s) Oral once PRN  dextrose 5%. 1000 milliLiter(s) IV Continuous <Continuous>  dextrose 50% Injectable 12.5 Gram(s) IV Push once  dextrose 50% Injectable 25 Gram(s) IV Push once  dextrose 50% Injectable 25 Gram(s) IV Push once  enoxaparin Injectable 40 milliGRAM(s) SubCutaneous daily  glucagon  Injectable 1 milliGRAM(s) IntraMuscular once PRN  influenza   Vaccine 0.5 milliLiter(s) IntraMuscular once  insulin glargine Injectable (LANTUS) 10 Unit(s) SubCutaneous at bedtime  insulin lispro (HumaLOG) corrective regimen sliding scale   SubCutaneous three times a day before meals  insulin lispro (HumaLOG) corrective regimen sliding scale   SubCutaneous at bedtime  insulin lispro Injectable (HumaLOG) 3 Unit(s) SubCutaneous three times a day before meals  oxyCODONE    IR 5 milliGRAM(s) Oral every 12 hours PRN  piperacillin/tazobactam IVPB.. 3.375 Gram(s) IV Intermittent every 8 hours  saccharomyces boulardii 250 milliGRAM(s) Oral two times a day  vancomycin  IVPB 1000 milliGRAM(s) IV Intermittent every 8 hours    FH:Family history of diabetes mellitus (DM)    PSX: S/P tonsillectomy    SH: acetaminophen   Tablet .. 650 milliGRAM(s) Oral every 6 hours PRN  dextrose 40% Gel 15 Gram(s) Oral once PRN  dextrose 5%. 1000 milliLiter(s) IV Continuous <Continuous>  dextrose 50% Injectable 12.5 Gram(s) IV Push once  dextrose 50% Injectable 25 Gram(s) IV Push once  dextrose 50% Injectable 25 Gram(s) IV Push once  enoxaparin Injectable 40 milliGRAM(s) SubCutaneous daily  glucagon  Injectable 1 milliGRAM(s) IntraMuscular once PRN  influenza   Vaccine 0.5 milliLiter(s) IntraMuscular once  insulin glargine Injectable (LANTUS) 10 Unit(s) SubCutaneous at bedtime  insulin lispro (HumaLOG) corrective regimen sliding scale   SubCutaneous three times a day before meals  insulin lispro (HumaLOG) corrective regimen sliding scale   SubCutaneous at bedtime  insulin lispro Injectable (HumaLOG) 3 Unit(s) SubCutaneous three times a day before meals  oxyCODONE    IR 5 milliGRAM(s) Oral every 12 hours PRN  piperacillin/tazobactam IVPB.. 3.375 Gram(s) IV Intermittent every 8 hours  saccharomyces boulardii 250 milliGRAM(s) Oral two times a day  vancomycin  IVPB 1000 milliGRAM(s) IV Intermittent every 8 hours      Vital Signs Last 24 Hrs  T(C): 36.7 (10 Sep 2020 09:00), Max: 37.2 (09 Sep 2020 16:41)  T(F): 98.1 (10 Sep 2020 09:00), Max: 99 (09 Sep 2020 16:41)  HR: 98 (10 Sep 2020 09:00) (89 - 119)  BP: 136/93 (10 Sep 2020 09:00) (136/93 - 174/111)  BP(mean): --  RR: 18 (10 Sep 2020 09:00) (18 - 20)  SpO2: 97% (10 Sep 2020 00:32) (97% - 99%)    LABS                        14.5   12.93 )-----------( 219      ( 10 Sep 2020 08:10 )             42.4               09-10    137  |  101  |  9.0  ----------------------------<  186<H>  3.7   |  22.0  |  0.55    Ca    9.0      10 Sep 2020 08:10    TPro  8.4  /  Alb  4.4  /  TBili  0.6  /  DBili  x   /  AST  31  /  ALT  21  /  AlkPhos  99  09-09      ROS  REVIEW OF SYSTEMS:    CONSTITUTIONAL: No fever, weight loss, or fatigue  EYES: No eye pain, visual disturbances, or discharge  ENMT:  No difficulty hearing, tinnitus, vertigo; No sinus or throat pain  NECK: No pain or stiffness  RESPIRATORY: No cough, wheezing, chills or hemoptysis; No shortness of breath  CARDIOVASCULAR: No chest pain, palpitations, dizziness, or right foot swelling  GASTROINTESTINAL: No abdominal or epigastric pain. No nausea, vomiting, or hematemesis; No diarrhea or constipation.   GENITOURINARY: No dysuria, frequency, hematuria, or incontinence  NEUROLOGICAL: No headaches, memory loss, loss of strength, numbness, or tremors  SKIN: No itching, burning, rashes, redness to right foot  ENDOCRINE: No heat or cold intolerance; No hair loss  MUSCULOSKELETAL: No joint pain or swelling; No muscle, back, right foot pain  PSYCHIATRIC: No depression, anxiety, mood swings, or difficulty sleeping  HEME/LYMPH: No easy bruising, or bleeding gums  ALLERGY AND IMMUNOLOGIC: No hives or eczema      PHYSICAL EXAM  GEN: GABRIEL SCHMIDT is a pleasant well-nourished, well developed 39y Male in no acute distress, alert awake, and oriented to person, place and time.   LE Focused:    Vasc: DP/PT palpable, CFT brisk to digit. skin temperature warm to warm. Right foot warmer compared to left  Derm: Ertythema edema noted to the lateral dorsum of the foot. digits are edematous, 4th webspace maceration with open lesion that is superficial does not track or probe deep. No discharge or malodor. Small focal fluctuance noted plantarly, upon debridement, ~1cc of purulence expressed, ulcer measureing 1.5cm x 1cm x 0.1cm down to subcutanous tissue, granular base, no tracking or tunneling. Negative probe to bone. Small hyperkeratotic lesion noted plantar hallux. No other lesions or rash noted.   Neuro: diminished protective sensation.   MSK: Pain to palpation to the right foot, patient guarding. No gross deformity noted    Imaging:   EXAM:  FOOT-RIGHT                          PROCEDURE DATE:  09/09/2020      INTERPRETATION:  RIGHT foot    CLINICAL INFORMATION:Injury with  Pain.  Comparison: 9/5/2020] foot radiographs  TECHNIQUE: AP,lateral and oblique views.    FINDINGS:    The bones and joint spaces are preserved.  There are no fractures or dislocations.  There is mild diffuse soft tissue swelling.    IMPRESSION:  Soft tissue swelling.  No acute radiographic osseous pathology.      KEVYN LIN M.D., ATTENDING RADIOLOGIST  This document has been electronically signed. Sep  9 2020  4:57P      Cultures: Right foot abscess culture sent to lab.     A: Right foot cellulitis     P:   Chart reviewed and Patient evaluated  Discussed diagnosis and treatment with patient  Obtained wound culture to be sent to Lab  Excisional debridement with 15 blade down to including subcutaneous tissue right foot blister/abscess  Applied betadine with dry sterile dressing to plantar wound and webspace  X-rays reviewed: negative study  Continue with IV antibiotics As Per ID  f/u abscess culture to guide antibiotics  Weight bearing as tolerated to Right heel in surgical shoe  A1c 12.6, may consider endocrine consult  continue close monitoring, if no improvement may consider MRI w/contrast to r/o deep abscess  Podiatry will follow while in house.  Discussed with Attending Dr. Benson

## 2020-09-10 NOTE — CONSULT NOTE ADULT - ASSESSMENT
pt. is a 40 y/o male with DM, not compliant with therapy for several months, presents to ER for rt. foot pain, redness and warmth that is worsening over past 6 -7  days. Pt was seen at [facility] ER twice over past several days, was initially given Cipro which was then changed to Bactrim/Keflex which he has used but did not get better and cellulitic area is worsening . Reports chills this morning, took motrin 4 hours prior to arrival. 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  no cp, no sob, no abd. pain.  no n/v/d. Last known TDAP within 1 year (09 Sep 2020 19:30)    no clear puncture wound.    labs reviewed, A1c of 12.6,   Less pain in the right foot today      impression:  poorly controlled DM2  Cellulitis of foot  WBC elevation      Plan:  - follow up cultures from blood  - need good DM control  WBC elevation is reactive, down trending.     - follow up all outstanding cultures  - trend temperature and WBC curve  - repeat cultures from blood and all sources if febrile.

## 2020-09-10 NOTE — PATIENT PROFILE ADULT - PACKS PER DAY
Start time: 0859
Cecum: 0901
TI intubation: no 
End time: 0908

Denhoff Bowel Prep Score: 6 
-right colon:                    2
-transverse colon:            2
-left colon:                      2
0

## 2020-09-10 NOTE — PATIENT PROFILE ADULT - TOBACCO USE
Providers





- Providers


Date of Admission: 


09/02/18 05:31





Date of discharge: 09/05/18


Attending physician: 


AMY J KOCHERLA





 





09/02/18 05:31


Consult to Physician [CONS] Routine 


   Comment: 


   Consulting Provider: FRANSISCO COUGHLIN


   Physician Instructions: 


   Reason For Exam: chf





09/04/18 10:04


Consult to Physician [CONS] Routine 


   Comment: 


   Consulting Provider: SHUN ADAME


   Physician Instructions: 


   Reason For Exam: Ac vs Ac on Chr Kidney disease











Primary care physician: 


PRIMARY CARE MD








Hospitalization


Reason for admission: worsening shortness of breath/chest tightness


Condition: Stable


Pertinent studies: 


Chest x-ray; cardiomegaly with congestive heart failure pattern


Echocardiogram; moderate concentric LVH ejection fraction 35-40%


Renal ultrasound; renal parenchymal disease


Exercise stress test; negative Lexiscansignificant ischemia moderate left 

ventricle dysfunction EF 30%





Hospital course: 


Very pleasant 52-year-old male patient, turned 53, 2 days ago , was admitted 

through emergency roomwith worsening


 shortness of breath orthopnea and chest tightness ,Initial evaluation is 

consistent with positive cardiac troponins, 


symptoms and findings consistent with systolic congestive heart failure


Admitted symptomatically managed, evaluated by cardiology, underwent 

echocardiogram, 


stress test the reports of the chest as mentioned above


Patient also had acute versus acute on chronic kidney disease, followed by 

nephrology during the hospital stay


Patient had hypertensive urgency with very difficult to control hypertension 

requiring multiple antihypertensives


Stress test was negative, blood pressures were brought to reasonable control, 

cardiology cleared for discharge





Today he is comfortable no new complaints, Vital signs stable


Physical examination prior to discharge is unremarkable


Smoking cessation counseling done


Strongly advised compliance with medications and diet


Patient verbalized understanding, patient was stable at discharge





Discharge diagnosis;


--Accelerated Hypertension; well controlled


--Chest pain/tightness; negative stress test


-- type 2NSTEMI


--Acute systolic congestive heart failure;EF 35%


--Acute kidney injury versus acute on chronic kidney disease; 


--Ongoing tobacco use; smoking cessation and nicotine patch advised


--Medical noncompliance; counseling


Disposition: DC-01 TO HOME OR SELFCARE


Time spent for discharge: 32 min





Core Measure Documentation





- Palliative Care


Palliative Care/ Comfort Measures: Not Applicable





- Core Measures


Any of the following diagnoses?: heart failure





- Heart Failure Discharge Requirements


ACE/ARB for LVSD if EF <40%: No


Reason for no ACE/ARB: Renal impairment


Beta blocker at discharge: Yes





Exam





- Constitutional


Vitals: 


 











Temp Pulse Resp BP Pulse Ox


 


 98.1 F   71   20   151/94   98 


 


 09/05/18 12:29  09/05/18 12:56  09/05/18 12:29  09/05/18 12:56  09/05/18 12:29











General appearance: Present: no acute distress, well-nourished





- EENT


Eyes: Present: PERRL, EOM intact





- Neck


Neck: Present: supple, normal ROM





- Respiratory


Respiratory effort: normal


Respiratory: bilateral: diminished, negative: rales, rhonchi, wheezing





- Cardiovascular


Rhythm: regular


Heart Sounds: Present: S1 & S2





- Extremities


Extremities: no ischemia, No edema


Peripheral Pulses: within normal limits





- Abdominal


General gastrointestinal: Present: soft, non-tender, non-distended, normal 

bowel sounds





- Integumentary


Integumentary: Present: clear, warm





- Musculoskeletal


Musculoskeletal: strength equal bilaterally





- Psychiatric


Psychiatric: appropriate mood/affect, cooperative





- Neurologic


Neurologic: CNII-XII intact, moves all extremities





Plan


Activity: no restrictions


Diet: other (cardiac diet)


Special Instructions: smoking cessation


Additional Instructions: Smoking cessation advice.  Advised to be compliant 

with medications and diet and follow-up visits


Follow up with: 


PRIMARY CARE,MD [Primary Care Provider] - 3-5 Days


ARISTIDES ROBISON MD [Staff Physician] - 7 Days


AZEEM LARSON MD [Staff Physician] - 7 Days


Prescriptions: 


amLODIPine [Norvasc] 10 mg PO QDAY #30 tablet


Aspirin EC [Aspirin Enteric Coated TAB] 81 mg PO QDAY #30 tablet.


AtorvaSTATin [Lipitor] 40 mg PO QHS #30 tablet


Furosemide [Lasix TAB] 40 mg PO QDAY #30 tablet


hydrALAZINE [Apresoline TAB] 100 mg PO Q8HR #90 tablet


ISOSORBIDE MONOnitrate [Imdur ER] 30 mg PO QDAY #30 tablet


Metoprolol [Lopressor TAB] 50 mg PO BID #60 tablet


Nicotine [Habitrol] 21 mg TD DAILY #30 patch Former smoker

## 2020-09-11 LAB
ALBUMIN SERPL ELPH-MCNC: 3.4 G/DL — SIGNIFICANT CHANGE UP (ref 3.3–5.2)
ALP SERPL-CCNC: 78 U/L — SIGNIFICANT CHANGE UP (ref 40–120)
ALT FLD-CCNC: 17 U/L — SIGNIFICANT CHANGE UP
ANION GAP SERPL CALC-SCNC: 16 MMOL/L — SIGNIFICANT CHANGE UP (ref 5–17)
APPEARANCE UR: CLEAR — SIGNIFICANT CHANGE UP
AST SERPL-CCNC: 26 U/L — SIGNIFICANT CHANGE UP
BACTERIA # UR AUTO: NEGATIVE — SIGNIFICANT CHANGE UP
BILIRUB SERPL-MCNC: 0.5 MG/DL — SIGNIFICANT CHANGE UP (ref 0.4–2)
BILIRUB UR-MCNC: NEGATIVE — SIGNIFICANT CHANGE UP
BUN SERPL-MCNC: 17 MG/DL — SIGNIFICANT CHANGE UP (ref 8–20)
CALCIUM SERPL-MCNC: 9.4 MG/DL — SIGNIFICANT CHANGE UP (ref 8.6–10.2)
CHLORIDE SERPL-SCNC: 101 MMOL/L — SIGNIFICANT CHANGE UP (ref 98–107)
CO2 SERPL-SCNC: 18 MMOL/L — LOW (ref 22–29)
COLOR SPEC: YELLOW — SIGNIFICANT CHANGE UP
CREAT SERPL-MCNC: 0.59 MG/DL — SIGNIFICANT CHANGE UP (ref 0.5–1.3)
CRP SERPL-MCNC: 6.98 MG/DL — HIGH (ref 0–0.4)
DIFF PNL FLD: NEGATIVE — SIGNIFICANT CHANGE UP
EPI CELLS # UR: SIGNIFICANT CHANGE UP
ERYTHROCYTE [SEDIMENTATION RATE] IN BLOOD: 12 MM/HR — SIGNIFICANT CHANGE UP (ref 0–20)
GLUCOSE BLDC GLUCOMTR-MCNC: 168 MG/DL — HIGH (ref 70–99)
GLUCOSE BLDC GLUCOMTR-MCNC: 182 MG/DL — HIGH (ref 70–99)
GLUCOSE BLDC GLUCOMTR-MCNC: 188 MG/DL — HIGH (ref 70–99)
GLUCOSE BLDC GLUCOMTR-MCNC: 232 MG/DL — HIGH (ref 70–99)
GLUCOSE SERPL-MCNC: 167 MG/DL — HIGH (ref 70–99)
GLUCOSE UR QL: 1000 MG/DL
HCT VFR BLD CALC: 46.5 % — SIGNIFICANT CHANGE UP (ref 39–50)
HGB BLD-MCNC: 15.7 G/DL — SIGNIFICANT CHANGE UP (ref 13–17)
KETONES UR-MCNC: ABNORMAL
LEUKOCYTE ESTERASE UR-ACNC: NEGATIVE — SIGNIFICANT CHANGE UP
MAGNESIUM SERPL-MCNC: 1.9 MG/DL — SIGNIFICANT CHANGE UP (ref 1.8–2.6)
MCHC RBC-ENTMCNC: 30 PG — SIGNIFICANT CHANGE UP (ref 27–34)
MCHC RBC-ENTMCNC: 33.8 GM/DL — SIGNIFICANT CHANGE UP (ref 32–36)
MCV RBC AUTO: 88.9 FL — SIGNIFICANT CHANGE UP (ref 80–100)
NITRITE UR-MCNC: NEGATIVE — SIGNIFICANT CHANGE UP
PH UR: 6 — SIGNIFICANT CHANGE UP (ref 5–8)
PLATELET # BLD AUTO: 233 K/UL — SIGNIFICANT CHANGE UP (ref 150–400)
POTASSIUM SERPL-MCNC: 3.9 MMOL/L — SIGNIFICANT CHANGE UP (ref 3.5–5.3)
POTASSIUM SERPL-SCNC: 3.9 MMOL/L — SIGNIFICANT CHANGE UP (ref 3.5–5.3)
PROT SERPL-MCNC: 7.5 G/DL — SIGNIFICANT CHANGE UP (ref 6.6–8.7)
PROT UR-MCNC: 30 MG/DL
RBC # BLD: 5.23 M/UL — SIGNIFICANT CHANGE UP (ref 4.2–5.8)
RBC # FLD: 11.9 % — SIGNIFICANT CHANGE UP (ref 10.3–14.5)
RBC CASTS # UR COMP ASSIST: NEGATIVE /HPF — SIGNIFICANT CHANGE UP (ref 0–4)
SARS-COV-2 IGG SERPL QL IA: NEGATIVE — SIGNIFICANT CHANGE UP
SARS-COV-2 IGM SERPL IA-ACNC: <0.1 INDEX — SIGNIFICANT CHANGE UP
SODIUM SERPL-SCNC: 135 MMOL/L — SIGNIFICANT CHANGE UP (ref 135–145)
SP GR SPEC: 1.02 — SIGNIFICANT CHANGE UP (ref 1.01–1.02)
UROBILINOGEN FLD QL: NEGATIVE MG/DL — SIGNIFICANT CHANGE UP
WBC # BLD: 12.78 K/UL — HIGH (ref 3.8–10.5)
WBC # FLD AUTO: 12.78 K/UL — HIGH (ref 3.8–10.5)
WBC UR QL: SIGNIFICANT CHANGE UP

## 2020-09-11 PROCEDURE — 99232 SBSQ HOSP IP/OBS MODERATE 35: CPT

## 2020-09-11 RX ORDER — TADALAFIL 10 MG/1
1 TABLET, FILM COATED ORAL
Qty: 0 | Refills: 0 | DISCHARGE

## 2020-09-11 RX ORDER — LIDOCAINE HCL 20 MG/ML
1 VIAL (ML) INJECTION ONCE
Refills: 0 | Status: COMPLETED | OUTPATIENT
Start: 2020-09-11 | End: 2020-09-11

## 2020-09-11 RX ADMIN — Medication 3 UNIT(S): at 11:21

## 2020-09-11 RX ADMIN — Medication 166.67 MILLIGRAM(S): at 06:22

## 2020-09-11 RX ADMIN — INSULIN GLARGINE 10 UNIT(S): 100 INJECTION, SOLUTION SUBCUTANEOUS at 21:56

## 2020-09-11 RX ADMIN — Medication 166.67 MILLIGRAM(S): at 13:30

## 2020-09-11 RX ADMIN — Medication 250 MILLIGRAM(S): at 16:10

## 2020-09-11 RX ADMIN — Medication 2: at 07:36

## 2020-09-11 RX ADMIN — Medication 3 UNIT(S): at 07:36

## 2020-09-11 RX ADMIN — Medication 2: at 11:20

## 2020-09-11 RX ADMIN — Medication 166.67 MILLIGRAM(S): at 21:57

## 2020-09-11 RX ADMIN — Medication 2: at 16:07

## 2020-09-11 RX ADMIN — Medication 3 UNIT(S): at 16:07

## 2020-09-11 RX ADMIN — PIPERACILLIN AND TAZOBACTAM 25 GRAM(S): 4; .5 INJECTION, POWDER, LYOPHILIZED, FOR SOLUTION INTRAVENOUS at 06:22

## 2020-09-11 RX ADMIN — PIPERACILLIN AND TAZOBACTAM 25 GRAM(S): 4; .5 INJECTION, POWDER, LYOPHILIZED, FOR SOLUTION INTRAVENOUS at 21:57

## 2020-09-11 RX ADMIN — Medication 1 VIAL(S): at 09:02

## 2020-09-11 RX ADMIN — PIPERACILLIN AND TAZOBACTAM 25 GRAM(S): 4; .5 INJECTION, POWDER, LYOPHILIZED, FOR SOLUTION INTRAVENOUS at 13:30

## 2020-09-11 RX ADMIN — OXYCODONE HYDROCHLORIDE 5 MILLIGRAM(S): 5 TABLET ORAL at 16:40

## 2020-09-11 RX ADMIN — Medication 250 MILLIGRAM(S): at 06:24

## 2020-09-11 RX ADMIN — OXYCODONE HYDROCHLORIDE 5 MILLIGRAM(S): 5 TABLET ORAL at 16:10

## 2020-09-11 RX ADMIN — ENOXAPARIN SODIUM 40 MILLIGRAM(S): 100 INJECTION SUBCUTANEOUS at 11:20

## 2020-09-11 NOTE — PHYSICAL THERAPY INITIAL EVALUATION ADULT - ACTIVE RANGE OF MOTION EXAMINATION, REHAB EVAL
bilateral lower extremity Active ROM was WNL (within normal limits)/rosio. upper extremity Active ROM was WNL (within normal limits)

## 2020-09-11 NOTE — PHYSICAL THERAPY INITIAL EVALUATION ADULT - MANUAL MUSCLE TESTING RESULTS, REHAB EVAL
bilateral shoulder flex, elbow flex, hip flex, knee ext WNL; left ankle df WNL, right ankle df >3/5(*no resistance given due to foot dressings/ace wraps)

## 2020-09-11 NOTE — PROGRESS NOTE ADULT - ASSESSMENT
pt. is a 40 y/o male with DM, not compliant with therapy for several months, presents to ER for rt. foot pain, redness and warmth that is worsening over past 6 -7  days. Pt was seen at [facility] ER twice over past several days, was initially given Cipro which was then changed to Bactrim/Keflex which he has used but did not get better and cellulitic area is worsening . Reports chills this morning, took motrin 4 hours prior to arrival. 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  no cp, no sob, no abd. pain.  no n/v/d. Last known TDAP within 1 year (09 Sep 2020 19:30)    no clear puncture wound.    labs reviewed, A1c of 12.6,   Less pain in the right foot today      impression:  poorly controlled DM2  Cellulitis of foot  WBC elevation      Plan:  s/p debridement dorsum 9/11/2020 and de-an of the plantar aspect 9/10/2020    - follow up cultures from blood and surgical specimen  Continue empiric antibiotics:  piperacillin/tazobactam IVPB.. 3.375 Gram(s) IV Intermittent every 8 hours  vancomycin  IVPB 1250 milliGRAM(s) IV Intermittent every 8 hours    - need good DM control    WBC elevation is reactive, down trending.     - follow up all outstanding cultures  - trend temperature and WBC curve  - repeat cultures from blood and all sources if febrile.

## 2020-09-11 NOTE — PHYSICAL THERAPY INITIAL EVALUATION ADULT - PERTINENT HX OF CURRENT PROBLEM, REHAB EVAL
pt. is a 38 y/o male with h/o DM presents to ER for rt. foot pain, redness and warmth that is worsening over past 6 -7  days. Pt was seen at [facility] ER twice over past several days, was initially given Cipro which was then changed to Bactrim/Keflex which he has used but did not get better and cellulitic area is worsening . Reports chills this morning, took motrin 4 hours prior to arrival. 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

## 2020-09-11 NOTE — PROGRESS NOTE ADULT - ASSESSMENT
38 yo Male  with hx of DM on oral meds presented to the ED for worsening foot infection failed outpatient oral antibiotics.    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, has crack in between toes  XR unremarkable and no dvt on duplex  plan:  - continue broad spectrum abx Vanco and Zosyn  - appreciated ID and Podiatry eval  - Podiatry has done bed side cleaning and sent cultures, will follow  - blood cx pending  - monitor cbc  - prn pain control Tylenol and oxycodone for foot pain  - PT consulted, per Podiatry Weight bearing as tolerated to Right heel in surgical shoe    Leukocytosis due to sepsis and cellulitis  wbc 12   - will monitor    Uncontrolled DM type 2  on oral meds but not compliant  a1c 12%  -  lantus 10 units at bedtime and 3 units premeal, will adjust as needed  - ISS and hypoglycemic protocol  - Diabetes educator    DVT ppx: Lovenox  Dispo: remain inpatient likely 2-3 days on IV abx pending cultures  Fullcode

## 2020-09-11 NOTE — PHYSICAL THERAPY INITIAL EVALUATION ADULT - ADDITIONAL COMMENTS
pt states he is living alone in a 1-story house with 4 steps to enter +2 unreachable rails). pt independent with all mobility prior to admit. no DME.

## 2020-09-12 LAB
ANION GAP SERPL CALC-SCNC: 13 MMOL/L — SIGNIFICANT CHANGE UP (ref 5–17)
BUN SERPL-MCNC: 16 MG/DL — SIGNIFICANT CHANGE UP (ref 8–20)
CALCIUM SERPL-MCNC: 9.5 MG/DL — SIGNIFICANT CHANGE UP (ref 8.6–10.2)
CHLORIDE SERPL-SCNC: 99 MMOL/L — SIGNIFICANT CHANGE UP (ref 98–107)
CO2 SERPL-SCNC: 25 MMOL/L — SIGNIFICANT CHANGE UP (ref 22–29)
CREAT SERPL-MCNC: 0.63 MG/DL — SIGNIFICANT CHANGE UP (ref 0.5–1.3)
CULTURE RESULTS: NO GROWTH — SIGNIFICANT CHANGE UP
GLUCOSE BLDC GLUCOMTR-MCNC: 180 MG/DL — HIGH (ref 70–99)
GLUCOSE BLDC GLUCOMTR-MCNC: 192 MG/DL — HIGH (ref 70–99)
GLUCOSE BLDC GLUCOMTR-MCNC: 226 MG/DL — HIGH (ref 70–99)
GLUCOSE BLDC GLUCOMTR-MCNC: 258 MG/DL — HIGH (ref 70–99)
GLUCOSE SERPL-MCNC: 211 MG/DL — HIGH (ref 70–99)
HCT VFR BLD CALC: 46.9 % — SIGNIFICANT CHANGE UP (ref 39–50)
HGB BLD-MCNC: 15.9 G/DL — SIGNIFICANT CHANGE UP (ref 13–17)
MAGNESIUM SERPL-MCNC: 2 MG/DL — SIGNIFICANT CHANGE UP (ref 1.6–2.6)
MCHC RBC-ENTMCNC: 29.9 PG — SIGNIFICANT CHANGE UP (ref 27–34)
MCHC RBC-ENTMCNC: 33.9 GM/DL — SIGNIFICANT CHANGE UP (ref 32–36)
MCV RBC AUTO: 88.2 FL — SIGNIFICANT CHANGE UP (ref 80–100)
PLATELET # BLD AUTO: 241 K/UL — SIGNIFICANT CHANGE UP (ref 150–400)
POTASSIUM SERPL-MCNC: 4.4 MMOL/L — SIGNIFICANT CHANGE UP (ref 3.5–5.3)
POTASSIUM SERPL-SCNC: 4.4 MMOL/L — SIGNIFICANT CHANGE UP (ref 3.5–5.3)
RBC # BLD: 5.32 M/UL — SIGNIFICANT CHANGE UP (ref 4.2–5.8)
RBC # FLD: 11.9 % — SIGNIFICANT CHANGE UP (ref 10.3–14.5)
SODIUM SERPL-SCNC: 137 MMOL/L — SIGNIFICANT CHANGE UP (ref 135–145)
SPECIMEN SOURCE: SIGNIFICANT CHANGE UP
VANCOMYCIN TROUGH SERPL-MCNC: 7.2 UG/ML — LOW (ref 10–20)
WBC # BLD: 9.36 K/UL — SIGNIFICANT CHANGE UP (ref 3.8–10.5)
WBC # FLD AUTO: 9.36 K/UL — SIGNIFICANT CHANGE UP (ref 3.8–10.5)

## 2020-09-12 PROCEDURE — 99232 SBSQ HOSP IP/OBS MODERATE 35: CPT

## 2020-09-12 RX ORDER — INSULIN LISPRO 100/ML
4 VIAL (ML) SUBCUTANEOUS
Refills: 0 | Status: DISCONTINUED | OUTPATIENT
Start: 2020-09-12 | End: 2020-09-13

## 2020-09-12 RX ORDER — INSULIN GLARGINE 100 [IU]/ML
12 INJECTION, SOLUTION SUBCUTANEOUS AT BEDTIME
Refills: 0 | Status: DISCONTINUED | OUTPATIENT
Start: 2020-09-12 | End: 2020-09-13

## 2020-09-12 RX ADMIN — Medication 2: at 11:32

## 2020-09-12 RX ADMIN — Medication 166.67 MILLIGRAM(S): at 13:00

## 2020-09-12 RX ADMIN — Medication 0: at 22:04

## 2020-09-12 RX ADMIN — Medication 250 MILLIGRAM(S): at 16:08

## 2020-09-12 RX ADMIN — Medication 6: at 16:07

## 2020-09-12 RX ADMIN — Medication 3 UNIT(S): at 11:32

## 2020-09-12 RX ADMIN — PIPERACILLIN AND TAZOBACTAM 25 GRAM(S): 4; .5 INJECTION, POWDER, LYOPHILIZED, FOR SOLUTION INTRAVENOUS at 06:00

## 2020-09-12 RX ADMIN — PIPERACILLIN AND TAZOBACTAM 25 GRAM(S): 4; .5 INJECTION, POWDER, LYOPHILIZED, FOR SOLUTION INTRAVENOUS at 13:00

## 2020-09-12 RX ADMIN — PIPERACILLIN AND TAZOBACTAM 25 GRAM(S): 4; .5 INJECTION, POWDER, LYOPHILIZED, FOR SOLUTION INTRAVENOUS at 22:02

## 2020-09-12 RX ADMIN — Medication 2: at 07:30

## 2020-09-12 RX ADMIN — ENOXAPARIN SODIUM 40 MILLIGRAM(S): 100 INJECTION SUBCUTANEOUS at 11:31

## 2020-09-12 RX ADMIN — Medication 250 MILLIGRAM(S): at 06:00

## 2020-09-12 RX ADMIN — Medication 4 UNIT(S): at 16:07

## 2020-09-12 RX ADMIN — Medication 166.67 MILLIGRAM(S): at 06:00

## 2020-09-12 RX ADMIN — Medication 3 UNIT(S): at 07:30

## 2020-09-12 RX ADMIN — INSULIN GLARGINE 12 UNIT(S): 100 INJECTION, SOLUTION SUBCUTANEOUS at 22:02

## 2020-09-12 NOTE — PROGRESS NOTE ADULT - ASSESSMENT
40 yo Male  with hx of DM on oral meds presented to the ED for worsening foot infection that failed outpatient oral antibiotics. 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, has crack in between toes  XR unremarkable and no dvt on duplex  continue broad spectrum abx Vanco and Zosyn  ID recs appreciated  Podiatry recs appreciated   Bedside I&D was done  Wound cx pending  blood cx neg thus far  prn pain control Tylenol and oxycodone for foot pain  PT recommends Home on DC     Uncontrolled DM type 2  on oral meds but not compliant  a1c 12%  Increased to lantus 12 units at bedtime and 4 units premeal, will adjust as needed  ISS and hypoglycemic protocol  Diabetes educator    DVT ppx: Lovenox  Dispo: remain inpatient likely 2-3 days on IV abx pending cultures  Fullcode

## 2020-09-13 LAB
ALBUMIN SERPL ELPH-MCNC: 3.6 G/DL — SIGNIFICANT CHANGE UP (ref 3.3–5.2)
ALP SERPL-CCNC: 77 U/L — SIGNIFICANT CHANGE UP (ref 40–120)
ALT FLD-CCNC: 25 U/L — SIGNIFICANT CHANGE UP
ANION GAP SERPL CALC-SCNC: 13 MMOL/L — SIGNIFICANT CHANGE UP (ref 5–17)
AST SERPL-CCNC: 38 U/L — SIGNIFICANT CHANGE UP
BILIRUB SERPL-MCNC: 0.3 MG/DL — LOW (ref 0.4–2)
BUN SERPL-MCNC: 13 MG/DL — SIGNIFICANT CHANGE UP (ref 8–20)
CALCIUM SERPL-MCNC: 9.4 MG/DL — SIGNIFICANT CHANGE UP (ref 8.6–10.2)
CHLORIDE SERPL-SCNC: 100 MMOL/L — SIGNIFICANT CHANGE UP (ref 98–107)
CO2 SERPL-SCNC: 25 MMOL/L — SIGNIFICANT CHANGE UP (ref 22–29)
CREAT SERPL-MCNC: 0.54 MG/DL — SIGNIFICANT CHANGE UP (ref 0.5–1.3)
GLUCOSE BLDC GLUCOMTR-MCNC: 204 MG/DL — HIGH (ref 70–99)
GLUCOSE BLDC GLUCOMTR-MCNC: 209 MG/DL — HIGH (ref 70–99)
GLUCOSE BLDC GLUCOMTR-MCNC: 225 MG/DL — HIGH (ref 70–99)
GLUCOSE BLDC GLUCOMTR-MCNC: 266 MG/DL — HIGH (ref 70–99)
GLUCOSE BLDC GLUCOMTR-MCNC: 288 MG/DL — HIGH (ref 70–99)
GLUCOSE SERPL-MCNC: 201 MG/DL — HIGH (ref 70–99)
HCT VFR BLD CALC: 45.9 % — SIGNIFICANT CHANGE UP (ref 39–50)
HGB BLD-MCNC: 15.7 G/DL — SIGNIFICANT CHANGE UP (ref 13–17)
MCHC RBC-ENTMCNC: 30 PG — SIGNIFICANT CHANGE UP (ref 27–34)
MCHC RBC-ENTMCNC: 34.2 GM/DL — SIGNIFICANT CHANGE UP (ref 32–36)
MCV RBC AUTO: 87.6 FL — SIGNIFICANT CHANGE UP (ref 80–100)
PLATELET # BLD AUTO: 246 K/UL — SIGNIFICANT CHANGE UP (ref 150–400)
POTASSIUM SERPL-MCNC: 4.4 MMOL/L — SIGNIFICANT CHANGE UP (ref 3.5–5.3)
POTASSIUM SERPL-SCNC: 4.4 MMOL/L — SIGNIFICANT CHANGE UP (ref 3.5–5.3)
PROT SERPL-MCNC: 7.6 G/DL — SIGNIFICANT CHANGE UP (ref 6.6–8.7)
RBC # BLD: 5.24 M/UL — SIGNIFICANT CHANGE UP (ref 4.2–5.8)
RBC # FLD: 11.9 % — SIGNIFICANT CHANGE UP (ref 10.3–14.5)
SODIUM SERPL-SCNC: 138 MMOL/L — SIGNIFICANT CHANGE UP (ref 135–145)
WBC # BLD: 9.95 K/UL — SIGNIFICANT CHANGE UP (ref 3.8–10.5)
WBC # FLD AUTO: 9.95 K/UL — SIGNIFICANT CHANGE UP (ref 3.8–10.5)

## 2020-09-13 PROCEDURE — 99232 SBSQ HOSP IP/OBS MODERATE 35: CPT

## 2020-09-13 RX ORDER — INSULIN GLARGINE 100 [IU]/ML
15 INJECTION, SOLUTION SUBCUTANEOUS AT BEDTIME
Refills: 0 | Status: DISCONTINUED | OUTPATIENT
Start: 2020-09-13 | End: 2020-09-14

## 2020-09-13 RX ORDER — INSULIN LISPRO 100/ML
5 VIAL (ML) SUBCUTANEOUS
Refills: 0 | Status: DISCONTINUED | OUTPATIENT
Start: 2020-09-13 | End: 2020-09-14

## 2020-09-13 RX ADMIN — PIPERACILLIN AND TAZOBACTAM 25 GRAM(S): 4; .5 INJECTION, POWDER, LYOPHILIZED, FOR SOLUTION INTRAVENOUS at 06:27

## 2020-09-13 RX ADMIN — ENOXAPARIN SODIUM 40 MILLIGRAM(S): 100 INJECTION SUBCUTANEOUS at 11:58

## 2020-09-13 RX ADMIN — Medication 5 UNIT(S): at 11:58

## 2020-09-13 RX ADMIN — Medication 4: at 07:51

## 2020-09-13 RX ADMIN — Medication 4 UNIT(S): at 07:51

## 2020-09-13 RX ADMIN — INSULIN GLARGINE 15 UNIT(S): 100 INJECTION, SOLUTION SUBCUTANEOUS at 21:49

## 2020-09-13 RX ADMIN — Medication 250 MILLIGRAM(S): at 06:28

## 2020-09-13 RX ADMIN — Medication 6: at 16:28

## 2020-09-13 RX ADMIN — PIPERACILLIN AND TAZOBACTAM 25 GRAM(S): 4; .5 INJECTION, POWDER, LYOPHILIZED, FOR SOLUTION INTRAVENOUS at 16:28

## 2020-09-13 RX ADMIN — Medication 5 UNIT(S): at 16:29

## 2020-09-13 RX ADMIN — Medication 6: at 11:58

## 2020-09-13 RX ADMIN — Medication 250 MILLIGRAM(S): at 16:29

## 2020-09-13 RX ADMIN — INFLUENZA VIRUS VACCINE 0.5 MILLILITER(S): 15; 15; 15; 15 SUSPENSION INTRAMUSCULAR at 16:29

## 2020-09-13 NOTE — ADVANCED PRACTICE NURSE CONSULT - RECOMMEDATIONS
continue diabetes self management education  pls consider increase lantus to 15 units qhs and humalog to 5 units before meals + moderate ss  pts is covered for lantus and humalog pens

## 2020-09-13 NOTE — CHART NOTE - NSCHARTNOTEFT_GEN_A_CORE
To whom it may concern:    This letter is to verify that patient Douglas Lewis is currently admitted to [facility]. He has been here since 9/9/2020 and will be here until at least 9/14/2020 with ongoing care. Please call [phone] if any questions. Thank you.    Rick Loaiza MD

## 2020-09-13 NOTE — PROGRESS NOTE ADULT - ASSESSMENT
38 yo Male  with hx of DM on oral meds presented to the ED for worsening foot infection that failed outpatient oral antibiotics. 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, has crack in between toes  XR unremarkable and no dvt on duplex  continue broad spectrum abx Zosyn  Vanco discontinued as per ID   ID recs appreciated  Podiatry recs appreciated   Bedside I&D was done  Wound cx pending  blood cx neg thus far  prn pain control Tylenol and oxycodone for foot pain  PT recommends Home on DC     Uncontrolled DM type 2  on oral meds but not compliant  a1c 12%  Increased to lantus 15 units at bedtime and 2 units premeal, will adjust as needed  ISS and hypoglycemic protocol  Diabetes educator    DVT ppx: Lovenox  Dispo: remain inpatient likely 2-3 days on IV abx pending cultures  Full code

## 2020-09-13 NOTE — ADVANCED PRACTICE NURSE CONSULT - ASSESSMENT
went to see pt @ lunch time pt is a+ox3 c/o 0 pain. pt states he has diabetes for 5 yrs he takes xigduo and glypizide. he does nt test bg. pt was educated about diabetes disease process and the importance of adding insulin @ this time.  also was educated about the importance of maintaining euglycemic control in order to promote wound healing. pt verbalized understanding. pt was educated about the importance of bg monitoring and was advised to check bg before meals parameter provided.

## 2020-09-14 ENCOUNTER — TRANSCRIPTION ENCOUNTER (OUTPATIENT)
Age: 40
End: 2020-09-14

## 2020-09-14 LAB
CULTURE RESULTS: SIGNIFICANT CHANGE UP
CULTURE RESULTS: SIGNIFICANT CHANGE UP
GLUCOSE BLDC GLUCOMTR-MCNC: 192 MG/DL — HIGH (ref 70–99)
GLUCOSE BLDC GLUCOMTR-MCNC: 195 MG/DL — HIGH (ref 70–99)
GLUCOSE BLDC GLUCOMTR-MCNC: 209 MG/DL — HIGH (ref 70–99)
GLUCOSE BLDC GLUCOMTR-MCNC: 216 MG/DL — HIGH (ref 70–99)
SPECIMEN SOURCE: SIGNIFICANT CHANGE UP
SPECIMEN SOURCE: SIGNIFICANT CHANGE UP

## 2020-09-14 PROCEDURE — 99232 SBSQ HOSP IP/OBS MODERATE 35: CPT

## 2020-09-14 RX ORDER — PIPERACILLIN AND TAZOBACTAM 4; .5 G/20ML; G/20ML
4.5 INJECTION, POWDER, LYOPHILIZED, FOR SOLUTION INTRAVENOUS
Qty: 135 | Refills: 0
Start: 2020-09-14 | End: 2020-09-23

## 2020-09-14 RX ORDER — INSULIN LISPRO 100/ML
7 VIAL (ML) SUBCUTANEOUS
Refills: 0 | Status: DISCONTINUED | OUTPATIENT
Start: 2020-09-14 | End: 2020-09-15

## 2020-09-14 RX ORDER — INSULIN LISPRO 100/ML
7 VIAL (ML) SUBCUTANEOUS
Qty: 6.3 | Refills: 0
Start: 2020-09-14 | End: 2020-10-13

## 2020-09-14 RX ORDER — INSULIN GLARGINE 100 [IU]/ML
17 INJECTION, SOLUTION SUBCUTANEOUS AT BEDTIME
Refills: 0 | Status: DISCONTINUED | OUTPATIENT
Start: 2020-09-14 | End: 2020-09-14

## 2020-09-14 RX ORDER — INSULIN GLARGINE 100 [IU]/ML
20 INJECTION, SOLUTION SUBCUTANEOUS AT BEDTIME
Refills: 0 | Status: DISCONTINUED | OUTPATIENT
Start: 2020-09-14 | End: 2020-09-15

## 2020-09-14 RX ORDER — INSULIN GLARGINE 100 [IU]/ML
20 INJECTION, SOLUTION SUBCUTANEOUS
Qty: 60 | Refills: 0
Start: 2020-09-14 | End: 2020-10-13

## 2020-09-14 RX ORDER — DAPAGLIFLOZIN AND METFORMIN HYDROCHLORIDE 10; 1000 MG/1; MG/1
1 TABLET, FILM COATED, EXTENDED RELEASE ORAL
Qty: 0 | Refills: 0 | DISCHARGE

## 2020-09-14 RX ORDER — INSULIN GLARGINE 100 [IU]/ML
20 INJECTION, SOLUTION SUBCUTANEOUS
Qty: 6 | Refills: 0
Start: 2020-09-14 | End: 2020-10-13

## 2020-09-14 RX ADMIN — PIPERACILLIN AND TAZOBACTAM 25 GRAM(S): 4; .5 INJECTION, POWDER, LYOPHILIZED, FOR SOLUTION INTRAVENOUS at 00:34

## 2020-09-14 RX ADMIN — Medication 2: at 07:51

## 2020-09-14 RX ADMIN — PIPERACILLIN AND TAZOBACTAM 25 GRAM(S): 4; .5 INJECTION, POWDER, LYOPHILIZED, FOR SOLUTION INTRAVENOUS at 23:36

## 2020-09-14 RX ADMIN — Medication 250 MILLIGRAM(S): at 06:24

## 2020-09-14 RX ADMIN — Medication 4: at 16:48

## 2020-09-14 RX ADMIN — OXYCODONE HYDROCHLORIDE 5 MILLIGRAM(S): 5 TABLET ORAL at 18:42

## 2020-09-14 RX ADMIN — ENOXAPARIN SODIUM 40 MILLIGRAM(S): 100 INJECTION SUBCUTANEOUS at 12:03

## 2020-09-14 RX ADMIN — PIPERACILLIN AND TAZOBACTAM 25 GRAM(S): 4; .5 INJECTION, POWDER, LYOPHILIZED, FOR SOLUTION INTRAVENOUS at 08:52

## 2020-09-14 RX ADMIN — Medication 250 MILLIGRAM(S): at 16:53

## 2020-09-14 RX ADMIN — Medication 4: at 12:03

## 2020-09-14 RX ADMIN — PIPERACILLIN AND TAZOBACTAM 25 GRAM(S): 4; .5 INJECTION, POWDER, LYOPHILIZED, FOR SOLUTION INTRAVENOUS at 16:48

## 2020-09-14 RX ADMIN — Medication 5 UNIT(S): at 07:51

## 2020-09-14 RX ADMIN — INSULIN GLARGINE 20 UNIT(S): 100 INJECTION, SOLUTION SUBCUTANEOUS at 22:46

## 2020-09-14 RX ADMIN — Medication 7 UNIT(S): at 12:03

## 2020-09-14 RX ADMIN — Medication 7 UNIT(S): at 16:49

## 2020-09-14 NOTE — PROGRESS NOTE ADULT - ASSESSMENT
pt. is a 38 y/o male with DM, not compliant with therapy for several months, presents to ER for rt. foot pain, redness and warmth that is worsening over past 6 -7  days. Pt was seen at [facility] ER twice over past several days, was initially given Cipro which was then changed to Bactrim/Keflex which he has used but did not get better and cellulitic area is worsening . Reports chills this morning, took motrin 4 hours prior to arrival. 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  no cp, no sob, no abd. pain.  no n/v/d. Last known TDAP within 1 year (09 Sep 2020 19:30)    no clear puncture wound.    labs reviewed, A1c of 12.6,   Less pain in the right foot today      impression:  poorly controlled DM2  Cellulitis of foot  WBC elevation      Plan:  s/p debridement dorsum 9/11/2020 and de-an of the plantar aspect 9/10/2020    Surgical cultures and blood cultures are negative    for discharge to home today  - MIDLINE ordered  - for home, ZOSYN 4.5 grams Q8H  x 10 more days, thru 9/22/2020  - advised patient to drink plenty of fluids      WBC elevation resolve, normalized:  WBC Count: 9.95 K/uL (09-13-20 @ 09:05)  WBC Count: 9.36 K/uL (09-12-20 @ 07:49)  WBC Count: 12.78 K/uL (09-11-20 @ 08:45)  WBC Count: 12.93 K/uL (09-10-20 @ 08:10)  WBC Count: 16.41 K/uL (09-09-20 @ 17:49)    - need good DM control

## 2020-09-14 NOTE — PROCEDURE NOTE - ADDITIONAL PROCEDURE DETAILS
18G powerglide Bard midline 10cm catheter insertion. Right basilic vein, Good flash, flushes easily, Tourniquet removed, bed lowered, and sharps were disposed. Pt tolerated procedure well.

## 2020-09-14 NOTE — DISCHARGE NOTE PROVIDER - NSDCCPCAREPLAN_GEN_ALL_CORE_FT
PRINCIPAL DISCHARGE DIAGNOSIS  Diagnosis: Lower extremity cellulitis  Assessment and Plan of Treatment: Please continue IV antibiotics as prescribed. Please follow up with Podiatry and ID as outpaitent.      SECONDARY DISCHARGE DIAGNOSES  Diagnosis: Diabetes type 2, uncontrolled  Assessment and Plan of Treatment: Please take insulin as prescribed. Please measure blood sugars at home. Please follow up with your PMD as outpatient.

## 2020-09-14 NOTE — DISCHARGE NOTE PROVIDER - NSDCMRMEDTOKEN_GEN_ALL_CORE_FT
alcohol wipes: 1 application  3 times a day   Cialis 20 mg oral tablet: 1 tab(s) orally once a day, As Needed  glucometer : 1 application  3 times a day   glucose test strips: 1 application  3 times a day   ibuprofen 600 mg oral tablet: 1 tab(s) orally every 8 hours as need it for pain   insulin glargine 100 units/mL subcutaneous solution: 20 unit(s) subcutaneous once a day (at bedtime)   insulin lispro 100 units/mL injectable solution: 7 unit(s) injectable 3 times a day (before meals)   insulin needles: 1 application  4 times a day   lancets: 1 application  4 times a day   Zosyn 4 g-0.5 g intravenous injection: 4.5 gram(s) intravenously every 8 hours MDD:thru 9/23/2020; weekly CBC CMP [email or web address]

## 2020-09-14 NOTE — DISCHARGE NOTE PROVIDER - HOSPITAL COURSE
40 yo Male  with hx of DM on oral meds presented to the ED for worsening foot infection that failed outpatient oral antibiotics. Admitted for Sepsis secondary to right foot cellulitis. Patient underwent I&D by podiatry. Patient was started on broad spectrum antibiotics and ID was on board. Patients wound improved with IV antibiotics. Patient will get a Midline placed for Zosyn to be continued until 9/22. Patient was also found to have uncontrolled Diabetes with an A1C of 12%. Patient was educated in regards to diabetes and was educated on how to give insulin. Patient is stable for discharge with outpatient follow up.     RLE cellulitis  Uncontrolled Diabetes    Time spent on patients discharge 35 minutes

## 2020-09-14 NOTE — DISCHARGE NOTE PROVIDER - NSDCHHASSISTILLNESS_GEN_ALL_CORE
Does not require assistance to leave home.   Will need diabetes education for new start insulin. Will need help with packing of wound.

## 2020-09-14 NOTE — PROGRESS NOTE ADULT - ASSESSMENT
38 yo Male  with hx of DM on oral meds presented to the ED for worsening foot infection that failed outpatient oral antibiotics. 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, has crack in between toes  XR unremarkable and no dvt on duplex  ID recs appreciated  Podiatry recs appreciated   Bedside I&D was done  Wound cx pending  blood cx neg thus far  prn pain control Tylenol and oxycodone for foot pain  continue broad spectrum abx Zosyn - Will need until 9/22   Patient will require MID-line placement  Vanco discontinued as per ID   PT recommends Home on DC     Uncontrolled DM type 2  on oral meds but not compliant  a1c 12%  Increased to lantus 20 units at bedtime and 7 units premeal, will adjust as needed  ISS and hypoglycemic protocol  Diabetes educator    DVT ppx: Lovenox  Dispo: Will DC home today with Midline for antibiotics   Full code 38 yo Male  with hx of DM on oral meds presented to the ED for worsening foot infection that failed outpatient oral antibiotics. 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, has crack in between toes  XR unremarkable and no dvt on duplex  ID recs appreciated  Podiatry recs appreciated   Bedside I&D was done  Wound cx pending  blood cx neg thus far  prn pain control Tylenol and oxycodone for foot pain  continue broad spectrum abx Zosyn - Will need until 9/22   Patient will require MID-line placement  Vanco discontinued as per ID   PT recommends Home on DC     Uncontrolled DM type 2  on oral meds but not compliant  a1c 12%  Increased to lantus 20 units at bedtime and 7 units premeal, will adjust as needed  ISS and hypoglycemic protocol  Diabetes educator    DVT ppx: Lovenox  Dispo: Will DC home today with Midline for antibiotics - Medically stable for discharge - pending CM in regards to preparation of antibiotics.  Full code

## 2020-09-14 NOTE — ADVANCED PRACTICE NURSE CONSULT - RECOMMEDATIONS
continue diabetes self management education  pt to inject all insulin doses using pre field insulin syringe and rn supervision  pt is covered for lantus and humalog  strips and lancets for countur next ez glucometer  cc- pls set home care, pt is new to insulin

## 2020-09-14 NOTE — DISCHARGE NOTE PROVIDER - CARE PROVIDER_API CALL
Uvaldo Cody  INFECTIOUS DISEASE  [address] [facility] [facility], [address]  [place] [address]  Phone: [phone]  Fax: [phone]  Follow Up Time: 1 week    Hawk Benson (DPM)  Surgery  [address] [place] [address]  Phone: [phone]  Fax: [phone]  Follow Up Time: 1 week    Ted Carnes  ENDOCRINOLOGY/METAB/DIABETES  [facility] [place] [address]  Phone: [phone]  Fax: [phone]  Follow Up Time: 1 week    pcp,   Phone: (   )    -  Fax: (   )    -  Follow Up Time: 1-3 days   Uvaldo Cody  INFECTIOUS DISEASE  [address] [facility], [address]  [place] [address]  Phone: [phone]  Fax: [phone]  Follow Up Time: 1 week    Hawk Benson (DPM)  Surgery  [address] [place] [address]  Phone: [phone]  Fax: [phone]  Follow Up Time: 1 week    pcp,   Phone: (   )    -  Fax: (   )    -  Follow Up Time: 1-3 days

## 2020-09-14 NOTE — DISCHARGE NOTE PROVIDER - NSDCCONDITION_GEN_ALL_CORE
----- Message from Seth Nuno sent at 4/1/2018  8:43 PM CDT -----  Regarding: Methadone refill  Contact: [phone]  Please send refill script to the [facility].  nx  
PDMP reviewed; no aberrant behavior identified, prescription authorized.    
Patient called for Methadone refill  [facility]  
Stable

## 2020-09-14 NOTE — DIETITIAN INITIAL EVALUATION ADULT. - OTHER INFO
38 yo Male  with hx of DM on oral meds presented to the ED for worsening foot infection that failed outpatient oral antibiotics. Admitted for Sepsis secondary to right foot cellulitis. Pt reported good po intake PTA and currently with no recent wt changes. Provided with written and verbal diabetes nutrition education and encouraged importance of physical activity. Pt had many questions/concerns, all were addressed. Pt stated his girlfriend is an RN and type 1 diabetic and she will help with diet/lifestyle changes. HgbA1c 12% noted. Last documented BM 9/10.

## 2020-09-14 NOTE — PROCEDURE NOTE - NSPROCDETAILS_GEN_ALL_CORE
ultrasound guidance/ultrasound assessment/sterile dressing applied/sterile technique, catheter placed/location identified, draped/prepped, sterile technique used/supine position

## 2020-09-14 NOTE — ADVANCED PRACTICE NURSE CONSULT - ASSESSMENT
went to see pt in afternoon, pt is a+ox3 c/o 0 pain. pt was educated about ss of hypoglycemia and treatment pt verbalized understanding. pt has a fu appointment in dr garg's office in [place] on october 8 2020 @0900a. a new glucometer provided. written material aout diabetes self management education  and use f insulin pen provided

## 2020-09-14 NOTE — DISCHARGE NOTE PROVIDER - CARE PROVIDERS DIRECT ADDRESSES
,[email or web address],DirectAddress_Unknown,DirectAddress_Unknown,DirectAddress_Unknown ,[email or web address],DirectAddress_Unknown,DirectAddress_Unknown

## 2020-09-14 NOTE — DIETITIAN INITIAL EVALUATION ADULT. - PROBLEM SELECTOR PLAN 1
pt. will be on vanco, zosyn , follow cultures. ER has called ID and podiatry consults. elevation of RLE.

## 2020-09-14 NOTE — DISCHARGE NOTE PROVIDER - NSDCFUADDINST_GEN_ALL_CORE_FT
Follow up with PMD     take medications as prescribed     If you hav fever >100.3, chest pain, abdominal pain that is increasing, increasing nausea vomiting or diarrhea, headache with vision changes come to ED or call PMD immediately

## 2020-09-14 NOTE — DISCHARGE NOTE PROVIDER - PROVIDER TOKENS
PROVIDER:[TOKEN:[[id number]],FOLLOWUP:[1 week]],PROVIDER:[TOKEN:[[id number]],FOLLOWUP:[1 week]],PROVIDER:[TOKEN:[[id number]],FOLLOWUP:[1 week]],FREE:[LAST:[pcp],PHONE:[(   )    -],FAX:[(   )    -],FOLLOWUP:[1-3 days]] PROVIDER:[TOKEN:[[id number]],FOLLOWUP:[1 week]],PROVIDER:[TOKEN:[[id number]],FOLLOWUP:[1 week]],FREE:[LAST:[pcp],PHONE:[(   )    -],FAX:[(   )    -],FOLLOWUP:[1-3 days]]

## 2020-09-15 ENCOUNTER — TRANSCRIPTION ENCOUNTER (OUTPATIENT)
Age: 40
End: 2020-09-15

## 2020-09-15 VITALS
OXYGEN SATURATION: 97 % | DIASTOLIC BLOOD PRESSURE: 84 MMHG | TEMPERATURE: 99 F | SYSTOLIC BLOOD PRESSURE: 132 MMHG | HEART RATE: 74 BPM | RESPIRATION RATE: 18 BRPM

## 2020-09-15 LAB
CULTURE RESULTS: SIGNIFICANT CHANGE UP
GLUCOSE BLDC GLUCOMTR-MCNC: 182 MG/DL — HIGH (ref 70–99)
GLUCOSE BLDC GLUCOMTR-MCNC: 204 MG/DL — HIGH (ref 70–99)
SPECIMEN SOURCE: SIGNIFICANT CHANGE UP

## 2020-09-15 PROCEDURE — U0003: CPT

## 2020-09-15 PROCEDURE — 96374 THER/PROPH/DIAG INJ IV PUSH: CPT

## 2020-09-15 PROCEDURE — 87070 CULTURE OTHR SPECIMN AEROBIC: CPT

## 2020-09-15 PROCEDURE — 83605 ASSAY OF LACTIC ACID: CPT

## 2020-09-15 PROCEDURE — 85025 COMPLETE CBC W/AUTO DIFF WBC: CPT

## 2020-09-15 PROCEDURE — 73630 X-RAY EXAM OF FOOT: CPT

## 2020-09-15 PROCEDURE — 36415 COLL VENOUS BLD VENIPUNCTURE: CPT

## 2020-09-15 PROCEDURE — 82962 GLUCOSE BLOOD TEST: CPT

## 2020-09-15 PROCEDURE — 85652 RBC SED RATE AUTOMATED: CPT

## 2020-09-15 PROCEDURE — 80048 BASIC METABOLIC PNL TOTAL CA: CPT

## 2020-09-15 PROCEDURE — 90686 IIV4 VACC NO PRSV 0.5 ML IM: CPT

## 2020-09-15 PROCEDURE — 93971 EXTREMITY STUDY: CPT

## 2020-09-15 PROCEDURE — 83735 ASSAY OF MAGNESIUM: CPT

## 2020-09-15 PROCEDURE — 85027 COMPLETE CBC AUTOMATED: CPT

## 2020-09-15 PROCEDURE — 99239 HOSP IP/OBS DSCHRG MGMT >30: CPT

## 2020-09-15 PROCEDURE — 85730 THROMBOPLASTIN TIME PARTIAL: CPT

## 2020-09-15 PROCEDURE — 93005 ELECTROCARDIOGRAM TRACING: CPT

## 2020-09-15 PROCEDURE — 87086 URINE CULTURE/COLONY COUNT: CPT

## 2020-09-15 PROCEDURE — 83036 HEMOGLOBIN GLYCOSYLATED A1C: CPT

## 2020-09-15 PROCEDURE — 85610 PROTHROMBIN TIME: CPT

## 2020-09-15 PROCEDURE — 80053 COMPREHEN METABOLIC PANEL: CPT

## 2020-09-15 PROCEDURE — 96361 HYDRATE IV INFUSION ADD-ON: CPT

## 2020-09-15 PROCEDURE — 86140 C-REACTIVE PROTEIN: CPT

## 2020-09-15 PROCEDURE — 80202 ASSAY OF VANCOMYCIN: CPT

## 2020-09-15 PROCEDURE — 87040 BLOOD CULTURE FOR BACTERIA: CPT

## 2020-09-15 PROCEDURE — 97163 PT EVAL HIGH COMPLEX 45 MIN: CPT

## 2020-09-15 PROCEDURE — 86769 SARS-COV-2 COVID-19 ANTIBODY: CPT

## 2020-09-15 PROCEDURE — 81001 URINALYSIS AUTO W/SCOPE: CPT

## 2020-09-15 PROCEDURE — 99285 EMERGENCY DEPT VISIT HI MDM: CPT | Mod: 25

## 2020-09-15 RX ORDER — OXYCODONE AND ACETAMINOPHEN 5; 325 MG/1; MG/1
1 TABLET ORAL EVERY 4 HOURS
Refills: 0 | Status: DISCONTINUED | OUTPATIENT
Start: 2020-09-15 | End: 2020-09-15

## 2020-09-15 RX ADMIN — Medication 250 MILLIGRAM(S): at 05:52

## 2020-09-15 RX ADMIN — ENOXAPARIN SODIUM 40 MILLIGRAM(S): 100 INJECTION SUBCUTANEOUS at 12:13

## 2020-09-15 RX ADMIN — Medication 7 UNIT(S): at 08:06

## 2020-09-15 RX ADMIN — PIPERACILLIN AND TAZOBACTAM 25 GRAM(S): 4; .5 INJECTION, POWDER, LYOPHILIZED, FOR SOLUTION INTRAVENOUS at 11:01

## 2020-09-15 RX ADMIN — Medication 4: at 08:06

## 2020-09-15 RX ADMIN — Medication 7 UNIT(S): at 12:13

## 2020-09-15 RX ADMIN — OXYCODONE AND ACETAMINOPHEN 1 TABLET(S): 5; 325 TABLET ORAL at 11:00

## 2020-09-15 RX ADMIN — Medication 2: at 12:13

## 2020-09-15 NOTE — PROGRESS NOTE ADULT - SUBJECTIVE AND OBJECTIVE BOX
[facility] Division of Hospital Medicine    Chief Complaint:  Patient is a 39y old  Male who presents with a chief complaint of rt. foot cellulitis (12 Sep 2020 18:17)      SUBJECTIVE / OVERNIGHT EVENTS:  Patient was seen and examined at bedside. Patient denies any active complaints. He requested a letter that states that he is currently admitted to the hospital. Patient denies chest pain, SOB, abd pain, N/V, fever, chills, dysuria or any other complaints. All remainder ROS negative.     MEDICATIONS  (STANDING):  dextrose 5%. 1000 milliLiter(s) (50 mL/Hr) IV Continuous <Continuous>  dextrose 50% Injectable 12.5 Gram(s) IV Push once  dextrose 50% Injectable 25 Gram(s) IV Push once  dextrose 50% Injectable 25 Gram(s) IV Push once  enoxaparin Injectable 40 milliGRAM(s) SubCutaneous daily  influenza   Vaccine 0.5 milliLiter(s) IntraMuscular once  insulin glargine Injectable (LANTUS) 12 Unit(s) SubCutaneous at bedtime  insulin lispro (HumaLOG) corrective regimen sliding scale   SubCutaneous three times a day before meals  insulin lispro (HumaLOG) corrective regimen sliding scale   SubCutaneous at bedtime  insulin lispro Injectable (HumaLOG) 4 Unit(s) SubCutaneous three times a day before meals  piperacillin/tazobactam IVPB.. 3.375 Gram(s) IV Intermittent every 8 hours  saccharomyces boulardii 250 milliGRAM(s) Oral two times a day    MEDICATIONS  (PRN):  acetaminophen   Tablet .. 650 milliGRAM(s) Oral every 6 hours PRN Temp greater or equal to 38C (100.4F), Mild Pain (1 - 3), Moderate Pain (4 - 6)  dextrose 40% Gel 15 Gram(s) Oral once PRN Blood Glucose LESS THAN 70 milliGRAM(s)/deciliter  glucagon  Injectable 1 milliGRAM(s) IntraMuscular once PRN Glucose LESS THAN 70 milligrams/deciliter  oxyCODONE    IR 5 milliGRAM(s) Oral every 12 hours PRN Severe Pain (7 - 10)        I&O's Summary      PHYSICAL EXAM:  Vital Signs Last 24 Hrs  T(C): 36.6 (13 Sep 2020 08:11), Max: 36.8 (12 Sep 2020 16:22)  T(F): 97.9 (13 Sep 2020 08:11), Max: 98.3 (12 Sep 2020 16:22)  HR: 74 (13 Sep 2020 08:11) (69 - 87)  BP: 129/86 (13 Sep 2020 08:11) (129/86 - 132/85)  BP(mean): --  RR: 18 (13 Sep 2020 08:11) (18 - 18)  SpO2: 96% (13 Sep 2020 08:11) (96% - 97%)      Constitutional: NAD, well-developed, well-groomed  ENT: MMM, no thrush, Supple, No JVD  Lungs: Clear to auscultation bilaterally, good air entry, non-labored breathing  Cardio: RRR, S1/S2, No murmur  Abdomen: Soft, Nontender, Nondistended; Bowel sounds present  Extremities: No calf tenderness, No pitting edema  Musculoskeletal:   No clubbing or cyanosis of digits; no joint swelling or tenderness to palpation  Psych: A+O to person, place, and time; affect appropriate  Neuro: CN 2-12 are intact and symmetric; no gross sensory deficits;   Skin: RLE is wrapped     LABS:                        15.7   9.95  )-----------( 246      ( 13 Sep 2020 09:05 )             45.9     09-13    138  |  100  |  13.0  ----------------------------<  201<H>  4.4   |  25.0  |  0.54    Ca    9.4      13 Sep 2020 09:05  Mg     2.0     09-12    TPro  7.6  /  Alb  3.6  /  TBili  0.3<L>  /  DBili  x   /  AST  38  /  ALT  25  /  AlkPhos  77  09-13          Urinalysis Basic - ( 11 Sep 2020 12:44 )    Color: Yellow / Appearance: Clear / S.020 / pH: x  Gluc: x / Ketone: Large  / Bili: Negative / Urobili: Negative mg/dL   Blood: x / Protein: 30 mg/dL / Nitrite: Negative   Leuk Esterase: Negative / RBC: Negative /HPF / WBC 0-2   Sq Epi: x / Non Sq Epi: Occasional / Bacteria: Negative        Culture - Urine (collected 11 Sep 2020 21:57)  Source: .Urine Clean Catch (Midstream)  Final Report (12 Sep 2020 18:23):    No growth    Culture - Surgical Swab (collected 10 Sep 2020 22:15)  Source: .Surgical Swab right foot abscess  Preliminary Report (12 Sep 2020 19:01):    Rare Normal skin brittany isolated      CAPILLARY BLOOD GLUCOSE      POCT Blood Glucose.: 209 mg/dL (13 Sep 2020 07:50)  POCT Blood Glucose.: 204 mg/dL (13 Sep 2020 05:58)  POCT Blood Glucose.: 226 mg/dL (12 Sep 2020 21:59)  POCT Blood Glucose.: 258 mg/dL (12 Sep 2020 16:06)  POCT Blood Glucose.: 192 mg/dL (12 Sep 2020 11:31)        RADIOLOGY REVIEWED  
[facility] Division of Hospital Medicine    Chief Complaint:  Patient is a 39y old  Male who presents with a chief complaint of rt. foot cellulitis (11 Sep 2020 10:55)      SUBJECTIVE / OVERNIGHT EVENTS:  Patient was seen and examined at bedside. Patient resting and reports to be doing well. Patient denies chest pain, SOB, abd pain, N/V, fever, chills, dysuria or any other complaints. All remainder ROS negative.     MEDICATIONS  (STANDING):  dextrose 5%. 1000 milliLiter(s) (50 mL/Hr) IV Continuous <Continuous>  dextrose 50% Injectable 12.5 Gram(s) IV Push once  dextrose 50% Injectable 25 Gram(s) IV Push once  dextrose 50% Injectable 25 Gram(s) IV Push once  enoxaparin Injectable 40 milliGRAM(s) SubCutaneous daily  influenza   Vaccine 0.5 milliLiter(s) IntraMuscular once  insulin glargine Injectable (LANTUS) 10 Unit(s) SubCutaneous at bedtime  insulin lispro (HumaLOG) corrective regimen sliding scale   SubCutaneous three times a day before meals  insulin lispro (HumaLOG) corrective regimen sliding scale   SubCutaneous at bedtime  insulin lispro Injectable (HumaLOG) 3 Unit(s) SubCutaneous three times a day before meals  piperacillin/tazobactam IVPB.. 3.375 Gram(s) IV Intermittent every 8 hours  saccharomyces boulardii 250 milliGRAM(s) Oral two times a day  vancomycin  IVPB 1250 milliGRAM(s) IV Intermittent every 8 hours    MEDICATIONS  (PRN):  acetaminophen   Tablet .. 650 milliGRAM(s) Oral every 6 hours PRN Temp greater or equal to 38C (100.4F), Mild Pain (1 - 3), Moderate Pain (4 - 6)  dextrose 40% Gel 15 Gram(s) Oral once PRN Blood Glucose LESS THAN 70 milliGRAM(s)/deciliter  glucagon  Injectable 1 milliGRAM(s) IntraMuscular once PRN Glucose LESS THAN 70 milligrams/deciliter  oxyCODONE    IR 5 milliGRAM(s) Oral every 12 hours PRN Severe Pain (7 - 10)        I&O's Summary    11 Sep 2020 07:01  -  12 Sep 2020 07:00  --------------------------------------------------------  IN: 116 mL / OUT: 0 mL / NET: 116 mL        PHYSICAL EXAM:  Vital Signs Last 24 Hrs  T(C): 36.8 (11 Sep 2020 17:13), Max: 36.8 (11 Sep 2020 17:13)  T(F): 98.2 (11 Sep 2020 17:13), Max: 98.2 (11 Sep 2020 17:13)  HR: 100 (11 Sep 2020 17:) (100 - 100)  BP: 122/85 (11 Sep 2020 17:13) (122/85 - 122/85)  BP(mean): --  RR: 18 (11 Sep 2020 17:13) (18 - 18)  SpO2: 96% (11 Sep 2020 17:) (96% - 96%)      Constitutional: NAD, well-developed, well-groomed  ENT: MMM, no thrush, Supple, No JVD  Lungs: Clear to auscultation bilaterally, good air entry, non-labored breathing  Cardio: RRR, S1/S2, No murmur  Abdomen: Soft, Nontender, Nondistended; Bowel sounds present  Extremities: No calf tenderness, No pitting edema  Musculoskeletal:   No clubbing or cyanosis of digits; no joint swelling or tenderness to palpation  Psych: A+O to person, place, and time; affect appropriate  Neuro: CN 2-12 are intact and symmetric; no gross sensory deficits;   Skin: RLE wrapped in Gauze     LABS:                        15.9   9.36  )-----------( 241      ( 12 Sep 2020 07:49 )             46.9     -12    137  |  99  |  16.0  ----------------------------<  211<H>  4.4   |  25.0  |  0.63    Ca    9.5      12 Sep 2020 07:49  Mg     2.0     12    TPro  7.5  /  Alb  3.4  /  TBili  0.5  /  DBili  x   /  AST  26  /  ALT  17  /  AlkPhos  78  09-11          Urinalysis Basic - ( 11 Sep 2020 12:44 )    Color: Yellow / Appearance: Clear / S.020 / pH: x  Gluc: x / Ketone: Large  / Bili: Negative / Urobili: Negative mg/dL   Blood: x / Protein: 30 mg/dL / Nitrite: Negative   Leuk Esterase: Negative / RBC: Negative /HPF / WBC 0-2   Sq Epi: x / Non Sq Epi: Occasional / Bacteria: Negative        Culture - Surgical Swab (collected 10 Sep 2020 22:15)  Source: .Surgical Swab right foot abscess  Preliminary Report (11 Sep 2020 18:47):    No growth    Culture - Blood (collected 09 Sep 2020 18:47)  Source: .Blood Blood-Peripheral  Preliminary Report (11 Sep 2020 19:00):    No growth at 48 hours    Culture - Blood (collected 09 Sep 2020 18:46)  Source: .Blood Blood-Peripheral  Preliminary Report (11 Sep 2020 19:00):    No growth at 48 hours      CAPILLARY BLOOD GLUCOSE      POCT Blood Glucose.: 192 mg/dL (12 Sep 2020 11:31)  POCT Blood Glucose.: 180 mg/dL (12 Sep 2020 07:28)  POCT Blood Glucose.: 232 mg/dL (11 Sep 2020 21:55)  POCT Blood Glucose.: 188 mg/dL (11 Sep 2020 16:06)        RADIOLOGY REVIEWED  
[facility] Division of Hospital Medicine    Chief Complaint:  Patient is a 39y old  Male who presents with a chief complaint of rt. foot cellulitis (13 Sep 2020 18:34)      SUBJECTIVE / OVERNIGHT EVENTS:  Patient was seen and examined at bedside. Patient reports to be feeling well.   Patient denies chest pain, SOB, abd pain, N/V, fever, chills, dysuria or any other complaints. All remainder ROS negative.     MEDICATIONS  (STANDING):  dextrose 5%. 1000 milliLiter(s) (50 mL/Hr) IV Continuous <Continuous>  dextrose 50% Injectable 12.5 Gram(s) IV Push once  dextrose 50% Injectable 25 Gram(s) IV Push once  dextrose 50% Injectable 25 Gram(s) IV Push once  enoxaparin Injectable 40 milliGRAM(s) SubCutaneous daily  insulin glargine Injectable (LANTUS) 20 Unit(s) SubCutaneous at bedtime  insulin lispro (HumaLOG) corrective regimen sliding scale   SubCutaneous three times a day before meals  insulin lispro (HumaLOG) corrective regimen sliding scale   SubCutaneous at bedtime  insulin lispro Injectable (HumaLOG) 7 Unit(s) SubCutaneous three times a day before meals  piperacillin/tazobactam IVPB.. 3.375 Gram(s) IV Intermittent every 8 hours  saccharomyces boulardii 250 milliGRAM(s) Oral two times a day    MEDICATIONS  (PRN):  acetaminophen   Tablet .. 650 milliGRAM(s) Oral every 6 hours PRN Temp greater or equal to 38C (100.4F), Mild Pain (1 - 3), Moderate Pain (4 - 6)  dextrose 40% Gel 15 Gram(s) Oral once PRN Blood Glucose LESS THAN 70 milliGRAM(s)/deciliter  glucagon  Injectable 1 milliGRAM(s) IntraMuscular once PRN Glucose LESS THAN 70 milligrams/deciliter  oxyCODONE    IR 5 milliGRAM(s) Oral every 12 hours PRN Severe Pain (7 - 10)        I&O's Summary      PHYSICAL EXAM:  Vital Signs Last 24 Hrs  T(C): 36.7 (14 Sep 2020 07:32), Max: 36.7 (13 Sep 2020 16:26)  T(F): 98 (14 Sep 2020 07:32), Max: 98.1 (13 Sep 2020 16:26)  HR: 72 (14 Sep 2020 07:32) (71 - 94)  BP: 130/84 (14 Sep 2020 07:32) (122/75 - 130/84)  BP(mean): --  RR: 20 (14 Sep 2020 07:32) (20 - 20)  SpO2: 97% (14 Sep 2020 07:32) (97% - 98%)      Constitutional: NAD, well-developed, well-groomed  ENT: MMM, no thrush, Supple, No JVD  Lungs: Clear to auscultation bilaterally, good air entry, non-labored breathing  Cardio: RRR, S1/S2, No murmur  Abdomen: Soft, Nontender, Nondistended; Bowel sounds present  Extremities: RLE wrapped  Musculoskeletal:   No clubbing or cyanosis of digits; no joint swelling or tenderness to palpation  Psych: A+O to person, place, and time; affect appropriate  Neuro: CN 2-12 are intact and symmetric; no gross sensory deficits;   Skin: No rashes; no palpable lesions    LABS:                        15.7   9.95  )-----------( 246      ( 13 Sep 2020 09:05 )             45.9     09-13    138  |  100  |  13.0  ----------------------------<  201<H>  4.4   |  25.0  |  0.54    Ca    9.4      13 Sep 2020 09:05    TPro  7.6  /  Alb  3.6  /  TBili  0.3<L>  /  DBili  x   /  AST  38  /  ALT  25  /  AlkPhos  77  09-13              Culture - Urine (collected 11 Sep 2020 21:57)  Source: .Urine Clean Catch (Midstream)  Final Report (12 Sep 2020 18:23):    No growth      CAPILLARY BLOOD GLUCOSE      POCT Blood Glucose.: 195 mg/dL (14 Sep 2020 07:49)  POCT Blood Glucose.: 225 mg/dL (13 Sep 2020 21:47)  POCT Blood Glucose.: 266 mg/dL (13 Sep 2020 16:27)  POCT Blood Glucose.: 288 mg/dL (13 Sep 2020 11:56)        RADIOLOGY REVIEWED  
[facility] Division of Hospital Medicine    Chief Complaint:  Patient is a 39y old  Male who presents with a chief complaint of rt. foot cellulitis (15 Sep 2020 10:15)      SUBJECTIVE / OVERNIGHT EVENTS:  Patient was seen and examined at bedside. Patient states to be feeling well. Reports that he wants to leave and go home.   Patient denies chest pain, SOB, abd pain, N/V, fever, chills, dysuria or any other complaints. All remainder ROS negative.     MEDICATIONS  (STANDING):  dextrose 5%. 1000 milliLiter(s) (50 mL/Hr) IV Continuous <Continuous>  dextrose 50% Injectable 12.5 Gram(s) IV Push once  dextrose 50% Injectable 25 Gram(s) IV Push once  dextrose 50% Injectable 25 Gram(s) IV Push once  enoxaparin Injectable 40 milliGRAM(s) SubCutaneous daily  insulin glargine Injectable (LANTUS) 20 Unit(s) SubCutaneous at bedtime  insulin lispro (HumaLOG) corrective regimen sliding scale   SubCutaneous three times a day before meals  insulin lispro (HumaLOG) corrective regimen sliding scale   SubCutaneous at bedtime  insulin lispro Injectable (HumaLOG) 7 Unit(s) SubCutaneous three times a day before meals  oxycodone    5 mG/acetaminophen 325 mG 1 Tablet(s) Oral every 4 hours  piperacillin/tazobactam IVPB.. 3.375 Gram(s) IV Intermittent every 8 hours  saccharomyces boulardii 250 milliGRAM(s) Oral two times a day    MEDICATIONS  (PRN):  acetaminophen   Tablet .. 650 milliGRAM(s) Oral every 6 hours PRN Temp greater or equal to 38C (100.4F), Mild Pain (1 - 3), Moderate Pain (4 - 6)  dextrose 40% Gel 15 Gram(s) Oral once PRN Blood Glucose LESS THAN 70 milliGRAM(s)/deciliter  glucagon  Injectable 1 milliGRAM(s) IntraMuscular once PRN Glucose LESS THAN 70 milligrams/deciliter        I&O's Summary      PHYSICAL EXAM:  Vital Signs Last 24 Hrs  T(C): 36.8 (15 Sep 2020 07:33), Max: 36.9 (14 Sep 2020 15:39)  T(F): 98.2 (15 Sep 2020 07:33), Max: 98.5 (14 Sep 2020 15:39)  HR: 77 (15 Sep 2020 07:33) (76 - 96)  BP: 153/97 (15 Sep 2020 07:33) (127/83 - 153/97)  BP(mean): --  RR: 18 (15 Sep 2020 07:33) (16 - 20)  SpO2: 95% (15 Sep 2020 07:33) (95% - 95%)      Constitutional: NAD, well-developed, well-groomed  ENT: MMM, no thrush, Supple, No JVD  Lungs: Clear to auscultation bilaterally, good air entry, non-labored breathing  Cardio: RRR, S1/S2, No murmur  Abdomen: Soft, Nontender, Nondistended; Bowel sounds present  Extremities: No calf tenderness, No pitting edema, RLE wrapped by podiatry   Musculoskeletal:   No clubbing or cyanosis of digits; no joint swelling or tenderness to palpation  Psych: A+O to person, place, and time; affect appropriate  Neuro: CN 2-12 are intact and symmetric; no gross sensory deficits;   Skin: No rashes; no palpable lesions    LABS:                    CAPILLARY BLOOD GLUCOSE      POCT Blood Glucose.: 204 mg/dL (15 Sep 2020 07:46)  POCT Blood Glucose.: 192 mg/dL (14 Sep 2020 22:44)  POCT Blood Glucose.: 209 mg/dL (14 Sep 2020 16:47)  POCT Blood Glucose.: 216 mg/dL (14 Sep 2020 12:02)        RADIOLOGY REVIEWED  
[facility] Division of Hospital Medicine    Chief Complaint:  cellulitis    SUBJECTIVE / OVERNIGHT EVENTS: s/p ID by Podiatry. Some pain. Having normal bowel function. Willing to go home on insulin.     Patient denies chest pain, SOB, abd pain, N/V, fever, chills, dysuria or any other complaints. All remainder ROS negative.     MEDICATIONS  (STANDING):  dextrose 5%. 1000 milliLiter(s) (50 mL/Hr) IV Continuous <Continuous>  dextrose 50% Injectable 12.5 Gram(s) IV Push once  dextrose 50% Injectable 25 Gram(s) IV Push once  dextrose 50% Injectable 25 Gram(s) IV Push once  enoxaparin Injectable 40 milliGRAM(s) SubCutaneous daily  influenza   Vaccine 0.5 milliLiter(s) IntraMuscular once  insulin glargine Injectable (LANTUS) 10 Unit(s) SubCutaneous at bedtime  insulin lispro (HumaLOG) corrective regimen sliding scale   SubCutaneous three times a day before meals  insulin lispro (HumaLOG) corrective regimen sliding scale   SubCutaneous at bedtime  insulin lispro Injectable (HumaLOG) 3 Unit(s) SubCutaneous three times a day before meals  piperacillin/tazobactam IVPB.. 3.375 Gram(s) IV Intermittent every 8 hours  saccharomyces boulardii 250 milliGRAM(s) Oral two times a day  vancomycin  IVPB 1250 milliGRAM(s) IV Intermittent every 8 hours    MEDICATIONS  (PRN):  acetaminophen   Tablet .. 650 milliGRAM(s) Oral every 6 hours PRN Temp greater or equal to 38C (100.4F), Mild Pain (1 - 3), Moderate Pain (4 - 6)  dextrose 40% Gel 15 Gram(s) Oral once PRN Blood Glucose LESS THAN 70 milliGRAM(s)/deciliter  glucagon  Injectable 1 milliGRAM(s) IntraMuscular once PRN Glucose LESS THAN 70 milligrams/deciliter  oxyCODONE    IR 5 milliGRAM(s) Oral every 12 hours PRN Severe Pain (7 - 10)        I&O's Summary    11 Sep 2020 07:01  -  11 Sep 2020 10:55  --------------------------------------------------------  IN: 116 mL / OUT: 0 mL / NET: 116 mL        PHYSICAL EXAM:  Vital Signs Last 24 Hrs  T(C): 36.8 (11 Sep 2020 09:13), Max: 37.2 (10 Sep 2020 23:52)  T(F): 98.2 (11 Sep 2020 09:13), Max: 98.9 (10 Sep 2020 23:52)  HR: 108 (11 Sep 2020 09:13) (87 - 108)  BP: 143/80 (11 Sep 2020 09:13) (138/90 - 143/88)  BP(mean): --  RR: 18 (11 Sep 2020 09:13) (18 - 18)  SpO2: 94% (10 Sep 2020 23:52) (94% - 98%)      CONSTITUTIONAL: NAD, well-developed, well-groomed  ENMT: Moist oral mucosa, no pharyngeal injection or exudates; normal dentition  RESPIRATORY: Normal respiratory effort; lungs are clear to auscultation bilaterally  CARDIOVASCULAR: Regular rate and rhythm, normal S1 and S2, no murmur/rub/gallop; No lower extremity edema  ABDOMEN: Nontender to palpation, normoactive bowel sounds, no rebound/guarding;   MUSCLOSKELETAL:  no clubbing or cyanosis of digits; no joint swelling or tenderness to palpation  PSYCH: A+O to person, place, and time; affect appropriate  NEUROLOGY: CN 2-12 are intact and symmetric; no gross sensory deficits;   SKIN: right foot wrapped in Guaze    LABS:                        15.7   12.78 )-----------( 233      ( 11 Sep 2020 08:45 )             46.5     09-11    135  |  101  |  17.0  ----------------------------<  167<H>  3.9   |  18.0<L>  |  0.59    Ca    9.4      11 Sep 2020 08:44  Mg     1.9     09-11    TPro  7.5  /  Alb  3.4  /  TBili  0.5  /  DBili  x   /  AST  26  /  ALT  17  /  AlkPhos  78  09-11    PT/INR - ( 09 Sep 2020 17:49 )   PT: 11.4 sec;   INR: 0.98 ratio         PTT - ( 09 Sep 2020 17:49 )  PTT:30.7 sec          CAPILLARY BLOOD GLUCOSE      POCT Blood Glucose.: 182 mg/dL (11 Sep 2020 07:34)  POCT Blood Glucose.: 180 mg/dL (10 Sep 2020 22:23)  POCT Blood Glucose.: 162 mg/dL (10 Sep 2020 16:19)  POCT Blood Glucose.: 144 mg/dL (10 Sep 2020 11:42)        RADIOLOGY & ADDITIONAL TESTS:  Results Reviewed:   Imaging Personally Reviewed:  Electrocardiogram Personally Reviewed:
[facility]  INFECTIOUS DISEASES AND INTERNAL MEDICINE at [place]  =======================================================  Korey Del Valle MD  Diplomates American Board of Internal Medicine and Infectious Diseases  Tel  [phone]  Fax [phone]  =======================================================    [facility]-[id number]  GABRIEL SCHMIDT  follow up: RIGHT foot cellulitis    s/p de-an of #4 right plantar area  and debridement around #4 and #5 toes.     no new issues.   Cultures from foot is negative.     patient reports of failing oral Augmentin     =======================================================  REVIEW OF SYSTEMS:  CONSTITUTIONAL:  No Fever or chills  HEENT:  No diplopia or blurred vision.  No earache, sore throat or runny nose.  CARDIOVASCULAR:  No pressure, squeezing, strangling, tightness, heaviness or aching about the chest, neck, axilla or epigastrium.  RESPIRATORY:  No cough, shortness of breath  GASTROINTESTINAL:  No nausea, vomiting or diarrhea.  GENITOURINARY:  No dysuria, frequency or urgency. No Blood in urine  MUSCULOSKELETAL:  no joint aches, no muscle pain  SKIN:  as per HPI  NEUROLOGIC:  No Headaches, seizures or weakness.  PSYCHIATRIC:  No disorder of thought or mood.  ENDOCRINE:  No heat or cold intolerance  HEMATOLOGICAL:  No easy bruising or bleeding.   =======================================================  Allergies  No Known Allergies     ======================================================  Physical Exam:  ============    General:  No acute distress.  Eye: Pupils are equal, round and reactive to light, Extraocular movements are intact, Normal conjunctiva.  HENT: Normocephalic, Oral mucosa is moist, No pharyngeal erythema, No sinus tenderness.  Neck: Supple, No lymphadenopathy.  Respiratory: Lungs are clear to auscultation, Respirations are non-labored.  Cardiovascular: Normal rate, Regular rhythm,   Gastrointestinal: Soft, Non-tender, Non-distended, Normal bowel sounds.  Genitourinary: No costovertebral angle tenderness.  Lymphatics: No lymphadenopathy neck,   Musculoskeletal: Normal range of motion, Normal strength.  Integumentary:   RIGHT FOOT  - plantar area dried w/o exudate., skin between toes #3 and #4 slightly red, but better.   CELLULITIS On dorsum of foot has receded significantly  Neurologic: Alert, Oriented, No focal deficits, Cranial Nerves II-XII are grossly intact.  Psychiatric: Appropriate mood & affect.    =======================================================  Vitals:  ============  T(F): 98 (14 Sep 2020 07:32), Max: 98.1 (13 Sep 2020 16:26)  HR: 72 (14 Sep 2020 07:32)  BP: 130/84 (14 Sep 2020 07:32)  RR: 20 (14 Sep 2020 07:32)  SpO2: 97% (14 Sep 2020 07:32) (97% - 98%)  temp max in last 48H T(F): , Max: 98.3 (09-12-20 @ 16:22)    =======================================================  Current Antibiotics:  piperacillin/tazobactam IVPB.. 3.375 Gram(s) IV Intermittent every 8 hours    Other medications:  dextrose 5%. 1000 milliLiter(s) IV Continuous <Continuous>  dextrose 50% Injectable 12.5 Gram(s) IV Push once  dextrose 50% Injectable 25 Gram(s) IV Push once  dextrose 50% Injectable 25 Gram(s) IV Push once  enoxaparin Injectable 40 milliGRAM(s) SubCutaneous daily  insulin glargine Injectable (LANTUS) 20 Unit(s) SubCutaneous at bedtime  insulin lispro (HumaLOG) corrective regimen sliding scale   SubCutaneous three times a day before meals  insulin lispro (HumaLOG) corrective regimen sliding scale   SubCutaneous at bedtime  insulin lispro Injectable (HumaLOG) 7 Unit(s) SubCutaneous three times a day before meals  saccharomyces boulardii 250 milliGRAM(s) Oral two times a day      =======================================================  Labs:                        15.7   9.95  )-----------( 246      ( 13 Sep 2020 09:05 )             45.9      09-13    138  |  100  |  13.0  ----------------------------<  201<H>  4.4   |  25.0  |  0.54    Ca    9.4      13 Sep 2020 09:05    TPro  7.6  /  Alb  3.6  /  TBili  0.3<L>  /  DBili  x   /  AST  38  /  ALT  25  /  AlkPhos  77  09-13      Culture - Urine (collected 09-11-20 @ 21:57)  Source: .Urine Clean Catch (Midstream)  Final Report (09-12-20 @ 18:23):    No growth    Culture - Surgical Swab (collected 09-10-20 @ 22:15)  Source: .Surgical Swab right foot abscess    Culture - Blood (collected 09-09-20 @ 18:47)  Source: .Blood Blood-Peripheral    Culture - Blood (collected 09-09-20 @ 18:46)  Source: .Blood Blood-Peripheral      Creatinine, Serum: 0.54 mg/dL (09-13-20 @ 09:05)  Creatinine, Serum: 0.63 mg/dL (09-12-20 @ 07:49)  Creatinine, Serum: 0.59 mg/dL (09-11-20 @ 08:44)  Creatinine, Serum: 0.55 mg/dL (09-10-20 @ 08:10)  Creatinine, Serum: 0.76 mg/dL (09-09-20 @ 17:49)          C-Reactive Protein, Serum: 6.98 mg/dL (09-11-20 @ 16:27)    WBC Count: 9.95 K/uL (09-13-20 @ 09:05)  WBC Count: 9.36 K/uL (09-12-20 @ 07:49)  WBC Count: 12.78 K/uL (09-11-20 @ 08:45)  WBC Count: 12.93 K/uL (09-10-20 @ 08:10)  WBC Count: 16.41 K/uL (09-09-20 @ 17:49)      COVID-19 PCR: NotDetec (09-09-20 @ 19:23)      Alkaline Phosphatase, Serum: 77 U/L (09-13-20 @ 09:05)  Alkaline Phosphatase, Serum: 78 U/L (09-11-20 @ 08:44)  Alanine Aminotransferase (ALT/SGPT): 25 U/L (09-13-20 @ 09:05)  Alanine Aminotransferase (ALT/SGPT): 17 U/L (09-11-20 @ 08:44)  Aspartate Aminotransferase (AST/SGOT): 38 U/L (09-13-20 @ 09:05)  Aspartate Aminotransferase (AST/SGOT): 26 U/L (09-11-20 @ 08:44)  Bilirubin Total, Serum: 0.3 mg/dL (09-13-20 @ 09:05)  Bilirubin Total, Serum: 0.5 mg/dL (09-11-20 @ 08:44)    
[facility] Division of Hospital Medicine    Chief Complaint: cellulitis    SUBJECTIVE / OVERNIGHT EVENTS: Patient reports pain in right foot. He works as  and was doing work in a flooded basement. Worked in wet showes for many hours and theat where he believes the infection started. He tells be he has been a type 2 diabetic for about 4 years. Has not been taking his medications for a while. He is agreeable to go home on insulin.     Patient denies chest pain, SOB, abd pain, N/V, fever, chills, dysuria or any other complaints. All remainder ROS negative.     MEDICATIONS  (STANDING):  dextrose 5%. 1000 milliLiter(s) (50 mL/Hr) IV Continuous <Continuous>  dextrose 50% Injectable 12.5 Gram(s) IV Push once  dextrose 50% Injectable 25 Gram(s) IV Push once  dextrose 50% Injectable 25 Gram(s) IV Push once  enoxaparin Injectable 40 milliGRAM(s) SubCutaneous daily  influenza   Vaccine 0.5 milliLiter(s) IntraMuscular once  insulin glargine Injectable (LANTUS) 10 Unit(s) SubCutaneous at bedtime  insulin lispro (HumaLOG) corrective regimen sliding scale   SubCutaneous three times a day before meals  insulin lispro (HumaLOG) corrective regimen sliding scale   SubCutaneous at bedtime  insulin lispro Injectable (HumaLOG) 3 Unit(s) SubCutaneous three times a day before meals  piperacillin/tazobactam IVPB.. 3.375 Gram(s) IV Intermittent every 8 hours  saccharomyces boulardii 250 milliGRAM(s) Oral two times a day  vancomycin  IVPB 1000 milliGRAM(s) IV Intermittent every 8 hours    MEDICATIONS  (PRN):  acetaminophen   Tablet .. 650 milliGRAM(s) Oral every 6 hours PRN Temp greater or equal to 38C (100.4F), Mild Pain (1 - 3), Moderate Pain (4 - 6)  dextrose 40% Gel 15 Gram(s) Oral once PRN Blood Glucose LESS THAN 70 milliGRAM(s)/deciliter  glucagon  Injectable 1 milliGRAM(s) IntraMuscular once PRN Glucose LESS THAN 70 milligrams/deciliter  oxyCODONE    IR 5 milliGRAM(s) Oral every 12 hours PRN Severe Pain (7 - 10)      I&O's Summary    PHYSICAL EXAM:  Vital Signs Last 24 Hrs  T(C): 36.7 (10 Sep 2020 09:00), Max: 37.2 (09 Sep 2020 16:41)  T(F): 98.1 (10 Sep 2020 09:00), Max: 99 (09 Sep 2020 16:41)  HR: 98 (10 Sep 2020 09:00) (89 - 119)  BP: 136/93 (10 Sep 2020 09:00) (136/93 - 174/111)  BP(mean): --  RR: 18 (10 Sep 2020 09:00) (18 - 20)  SpO2: 97% (10 Sep 2020 00:32) (97% - 99%)        CONSTITUTIONAL: NAD, well-developed, well-groomed  ENMT: Moist oral mucosa, no pharyngeal injection or exudates; normal dentition  RESPIRATORY: Normal respiratory effort; lungs are clear to auscultation bilaterally  CARDIOVASCULAR: Regular rate and rhythm, normal S1 and S2, no murmur/rub/gallop; No lower extremity edema  ABDOMEN: Nontender to palpation, normoactive bowel sounds, no rebound/guarding;   MUSCLOSKELETAL:   no clubbing or cyanosis of digits; no joint swelling or tenderness to palpation  PSYCH: A+O to person, place, and time; affect appropriate  NEUROLOGY: CN 2-12 are intact and symmetric; no gross sensory deficits;   SKIN: right foot and 3rd and 4th metatarsal  erythema, plantar aspect open wound    LABS:                        14.5   12.93 )-----------( 219      ( 10 Sep 2020 08:10 )             42.4     09-10    137  |  101  |  9.0  ----------------------------<  186<H>  3.7   |  22.0  |  0.55    Ca    9.0      10 Sep 2020 08:10    TPro  8.4  /  Alb  4.4  /  TBili  0.6  /  DBili  x   /  AST  31  /  ALT  21  /  AlkPhos  99  09-09    PT/INR - ( 09 Sep 2020 17:49 )   PT: 11.4 sec;   INR: 0.98 ratio         PTT - ( 09 Sep 2020 17:49 )  PTT:30.7 sec          CAPILLARY BLOOD GLUCOSE      POCT Blood Glucose.: 144 mg/dL (10 Sep 2020 11:42)  POCT Blood Glucose.: 211 mg/dL (10 Sep 2020 07:40)  POCT Blood Glucose.: 286 mg/dL (09 Sep 2020 21:37)        RADIOLOGY & ADDITIONAL TESTS:  Results Reviewed:   Imaging Personally Reviewed:  Electrocardiogram Personally Reviewed:
[facility]  INFECTIOUS DISEASES AND INTERNAL MEDICINE at [place]  =======================================================  Korey Del Valle MD  Diplomates American Board of Internal Medicine and Infectious Diseases  Tel  [phone]  Fax [phone]  =======================================================    [facility]-[id number]  GABRIEL SCHMIDT  follow up: RIGHT foot cellulitis    s/p de-an of #4 right plantar area  and debridement around #4 and #5 toes.     no fevers  reports less pain in the foot.     =======================================================  REVIEW OF SYSTEMS:  CONSTITUTIONAL:  No Fever or chills  HEENT:  No diplopia or blurred vision.  No earache, sore throat or runny nose.  CARDIOVASCULAR:  No pressure, squeezing, strangling, tightness, heaviness or aching about the chest, neck, axilla or epigastrium.  RESPIRATORY:  No cough, shortness of breath  GASTROINTESTINAL:  No nausea, vomiting or diarrhea.  GENITOURINARY:  No dysuria, frequency or urgency. No Blood in urine  MUSCULOSKELETAL:  no joint aches, no muscle pain  SKIN:  as per HPI  NEUROLOGIC:  No Headaches, seizures or weakness.  PSYCHIATRIC:  No disorder of thought or mood.  ENDOCRINE:  No heat or cold intolerance  HEMATOLOGICAL:  No easy bruising or bleeding.   =======================================================  Allergies  No Known Allergies     ======================================================  Physical Exam:  ============    General:  No acute distress.  Eye: Pupils are equal, round and reactive to light, Extraocular movements are intact, Normal conjunctiva.  HENT: Normocephalic, Oral mucosa is moist, No pharyngeal erythema, No sinus tenderness.  Neck: Supple, No lymphadenopathy.  Respiratory: Lungs are clear to auscultation, Respirations are non-labored.  Cardiovascular: Normal rate, Regular rhythm,   Gastrointestinal: Soft, Non-tender, Non-distended, Normal bowel sounds.  Genitourinary: No costovertebral angle tenderness.  Lymphatics: No lymphadenopathy neck,   Musculoskeletal: Normal range of motion, Normal strength.  Integumentary:   RIGHT FOOT WRAPPED   Neurologic: Alert, Oriented, No focal deficits, Cranial Nerves II-XII are grossly intact.  Psychiatric: Appropriate mood & affect.    =======================================================    Vitals:  ============   Vital Signs Last 24 Hrs  T(C): 36.8 (12 Sep 2020 16:22), Max: 36.8 (11 Sep 2020 17:13)  T(F): 98.3 (12 Sep 2020 16:22), Max: 98.3 (12 Sep 2020 16:22)  HR: 87 (12 Sep 2020 16:22) (87 - 100)  BP: 132/85 (12 Sep 2020 16:22) (122/85 - 132/85)  BP(mean): --  RR: 18 (11 Sep 2020 17:13) (18 - 18)  SpO2: 96% (11 Sep 2020 17:13) (96% - 96%)  =======================================================  Current Antibiotics:  piperacillin/tazobactam IVPB.. 3.375 Gram(s) IV Intermittent every 8 hours  vancomycin  IVPB 1250 milliGRAM(s) IV Intermittent every 8 hours    Other medications:  dextrose 5%. 1000 milliLiter(s) IV Continuous <Continuous>  dextrose 50% Injectable 12.5 Gram(s) IV Push once  dextrose 50% Injectable 25 Gram(s) IV Push once  dextrose 50% Injectable 25 Gram(s) IV Push once  enoxaparin Injectable 40 milliGRAM(s) SubCutaneous daily  influenza   Vaccine 0.5 milliLiter(s) IntraMuscular once  insulin glargine Injectable (LANTUS) 10 Unit(s) SubCutaneous at bedtime  insulin lispro (HumaLOG) corrective regimen sliding scale   SubCutaneous three times a day before meals  insulin lispro (HumaLOG) corrective regimen sliding scale   SubCutaneous at bedtime  insulin lispro Injectable (HumaLOG) 3 Unit(s) SubCutaneous three times a day before meals  saccharomyces boulardii 250 milliGRAM(s) Oral two times a day      =======================================================  Labs:                                               15.9   9.36  )-----------( 241      ( 12 Sep 2020 07:49 )             46.9   09-12    137  |  99  |  16.0  ----------------------------<  211<H>  4.4   |  25.0  |  0.63    Ca    9.5      12 Sep 2020 07:49  Mg     2.0     09-12    TPro  7.5  /  Alb  3.4  /  TBili  0.5  /  DBili  x   /  AST  26  /  ALT  17  /  AlkPhos  78  09-11           WBC Count: 12.78 K/uL (09-11-20 @ 08:45)  WBC Count: 12.93 K/uL (09-10-20 @ 08:10)  WBC Count: 16.41 K/uL (09-09-20 @ 17:49)      COVID-19 PCR: NotDetec (09-09-20 @ 19:23)      Alkaline Phosphatase, Serum: 78 U/L (09-11-20 @ 08:44)  Alkaline Phosphatase, Serum: 99 U/L (09-09-20 @ 17:49)  Alanine Aminotransferase (ALT/SGPT): 17 U/L (09-11-20 @ 08:44)  Alanine Aminotransferase (ALT/SGPT): 21 U/L (09-09-20 @ 17:49)  Aspartate Aminotransferase (AST/SGOT): 26 U/L (09-11-20 @ 08:44)  Aspartate Aminotransferase (AST/SGOT): 31 U/L (09-09-20 @ 17:49)  Bilirubin Total, Serum: 0.5 mg/dL (09-11-20 @ 08:44)  Bilirubin Total, Serum: 0.6 mg/dL (09-09-20 @ 17:49)    
Patient is a 39y old  Male who presents with a chief complaint of rt. foot cellulitis (10 Sep 2020 12:18)    Podiatry Interval HPI: Patient was seen bedside  resting comfortable in NAD. No acute event overnight, no new complaints. Has continued pain in the right foot, little improvement. Patient denies f/c/n/v or SOB. Dressing Intact.     HPI:  pt. is a 40 y/o male with h/o DM presents to ER for rt. foot pain, redness and warmth that is worsening over past 6 -7  days. Pt was seen at [facility] ER twice over past several days, was initially given Cipro which was then changed to Bactrim/Keflex which he has used but did not get better and cellulitic area is worsening . Reports chills this morning, took motrin 4 hours prior to arrival. pt. works in [employer] business and thinks likely stepped on something about 1 week ago and later his foot got redness, warmth and swelling.  no cp, no sob, no abd. pain.  no n/v/d. Last known TDAP within 1 year (09 Sep 2020 19:30)      Podiatry HPI: History as above. 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 He was previous discharged from ED with oral antibiotics however the redness worsened with increased pain to the right foot. Patient does not follow a podiatrist outpatient. Patient endorses tingling sensation in toes. Denies f/c/n/v or SOB.     PMH: Diabetes    Allergies: No Known Allergies    MEDICATIONS  (STANDING):  dextrose 5%. 1000 milliLiter(s) (50 mL/Hr) IV Continuous <Continuous>  dextrose 50% Injectable 12.5 Gram(s) IV Push once  dextrose 50% Injectable 25 Gram(s) IV Push once  dextrose 50% Injectable 25 Gram(s) IV Push once  enoxaparin Injectable 40 milliGRAM(s) SubCutaneous daily  influenza   Vaccine 0.5 milliLiter(s) IntraMuscular once  insulin glargine Injectable (LANTUS) 10 Unit(s) SubCutaneous at bedtime  insulin lispro (HumaLOG) corrective regimen sliding scale   SubCutaneous three times a day before meals  insulin lispro (HumaLOG) corrective regimen sliding scale   SubCutaneous at bedtime  insulin lispro Injectable (HumaLOG) 3 Unit(s) SubCutaneous three times a day before meals  piperacillin/tazobactam IVPB.. 3.375 Gram(s) IV Intermittent every 8 hours  saccharomyces boulardii 250 milliGRAM(s) Oral two times a day  vancomycin  IVPB 1250 milliGRAM(s) IV Intermittent every 8 hours    MEDICATIONS  (PRN):  acetaminophen   Tablet .. 650 milliGRAM(s) Oral every 6 hours PRN Temp greater or equal to 38C (100.4F), Mild Pain (1 - 3), Moderate Pain (4 - 6)  dextrose 40% Gel 15 Gram(s) Oral once PRN Blood Glucose LESS THAN 70 milliGRAM(s)/deciliter  glucagon  Injectable 1 milliGRAM(s) IntraMuscular once PRN Glucose LESS THAN 70 milligrams/deciliter  oxyCODONE    IR 5 milliGRAM(s) Oral every 12 hours PRN Severe Pain (7 - 10)      FH:Family history of diabetes mellitus (DM)    PSX: S/P tonsillectomy    SH: acetaminophen   Tablet .. 650 milliGRAM(s) Oral every 6 hours PRN  dextrose 40% Gel 15 Gram(s) Oral once PRN  dextrose 5%. 1000 milliLiter(s) IV Continuous <Continuous>                        15.9   9.36  )-----------( 241      ( 12 Sep 2020 07:49 )             46.9   dextrose 50% Injectable 12.5 Gram(s) IV Push once  dextrose 50% Injectable 25 Gram(s) IV Push once  dextrose 50% Injectable 25 Gram(s) IV Push once  enoxaparin Injectable 40 milliGRAM(s) SubCutaneous daily  glucagon  Injectable 1 milliGRAM(s) IntraMuscular once PRN  influenza   Vaccine 0.5 milliLiter(s) IntraMuscular once  insulin glargine Injectable (LANTUS) 10 Unit(s) SubCutaneous at bedtime  insulin lispro (HumaLOG) corrective regimen sliding scale   SubCutaneous three times a day before meals  insulin lispro (HumaLOG) corrective regimen sliding scale   SubCutaneous at bedtime  insulin lispro Injectable (HumaLOG) 3 Unit(s) SubCutaneous three times a day before meals  oxyCODONE    IR 5 milliGRAM(s) Oral every 12 hours PRN  piperacillin/tazobactam IVPB.. 3.375 Gram(s) IV Intermittent every 8 hours  saccharomyces boulardii 250 milliGRAM(s) Oral two times a day  vancomycin  IVPB 1000 milliGRAM(s) IV Intermittent every 8 hours    09-12    137  |  99  |  16.0  ----------------------------<  211<H>  4.4   |  25.0  |  0.63    Ca    9.5      12 Sep 2020 07:49  Mg     2.0     09-12    TPro  7.5  /  Alb  3.4  /  TBili  0.5  /  DBili  x   /  AST  26  /  ALT  17  /  AlkPhos  78  09-11    Vital Signs Last 24 Hrs  T(C): 36.8 (12 Sep 2020 16:22), Max: 36.8 (12 Sep 2020 16:22)  T(F): 98.3 (12 Sep 2020 16:22), Max: 98.3 (12 Sep 2020 16:22)  HR: 87 (12 Sep 2020 16:22) (87 - 87)  BP: 132/85 (12 Sep 2020 16:22) (132/85 - 132/85)  BP(mean): --  RR: --  SpO2: --                      PHYSICAL EXAM  GEN: GABRIEL SCHMIDT is a pleasant well-nourished, well developed 39y Male in no acute distress, alert awake, and oriented to person, place and time.   LE Focused:    Vasc: DP/PT palpable, CFT brisk to digit. skin temperature warm to warm. Right foot warmer compared to left  Derm: Ertythema edema noted to the lateral dorsum of the foot, persistenet. digits are edematous, 4th webspace maceration with open lesion that is superficial does not track or probe deep. Dorsal blister at the base of 3rd toe. No discharge or malodor. Superfical ulcer post debridment measuring 1.5cm x 1cm x 0.1cm plantar without surround erythema, no tracking or tunneling, healing well. Negative probe to bone. Small hyperkeratotic lesion noted plantar hallux. No other lesions or rash noted. Overall edema improved  Neuro: diminished protective sensation.   MSK: Pain to palpation to the right foot, patient guarding. No gross deformity noted    Imaging:   EXAM:  FOOT-RIGHT                          PROCEDURE DATE:  09/09/2020      INTERPRETATION:  RIGHT foot    CLINICAL INFORMATION:Injury with  Pain.  Comparison: 9/5/2020] foot radiographs  TECHNIQUE: AP,lateral and oblique views.    FINDINGS:    The bones and joint spaces are preserved.  There are no fractures or dislocations.  There is mild diffuse soft tissue swelling.    IMPRESSION:  Soft tissue swelling.  No acute radiographic osseous pathology.      KEVYN LIN M.D., ATTENDING RADIOLOGIST  This document has been electronically signed. Sep  9 2020  4:57P      Cultures: Right foot abscess culture sent to lab.     A: Right foot cellulitis       s/p Bedside I&D 9/11    P:   Chart reviewed and Patient evaluated  Discussed diagnosis and treatment with patient  Obtained wound culture to be sent to Lab, f/u result  Vanco trough low 4.7  Wound flushed with copious saline betadine mixture and packed with iodoform packing  Applied betadine with dry sterile dressing   X-rays reviewed: negative study  Continue with IV antibiotics As Per ID  f/u wound culture and sensitivity  Weight bearing as tolerated to Right heel in surgical shoe  A1c 12.6, may consider endocrine consult, patient does not have endocrinologist  continue close monitoring of erythema  Podiatry will follow while in house.  Discussed  with Attending Dr. Carranza   
Patient is a 39y old  Male who presents with a chief complaint of rt. foot cellulitis (10 Sep 2020 12:18)    Podiatry Interval HPI: Patient was seen bedside resting comfortable in NAD. No acute event overnight, no new complaints. Has continued pain in the right foot, little improvement. Patient denies f/c/n/v or SOB. Dressing Intact.     HPI:  pt. is a 38 y/o male with h/o DM presents to ER for rt. foot pain, redness and warmth that is worsening over past 6 -7  days. Pt was seen at [facility] ER twice over past several days, was initially given Cipro which was then changed to Bactrim/Keflex which he has used but did not get better and cellulitic area is worsening . Reports chills this morning, took motrin 4 hours prior to arrival. pt. works in [employer] business and thinks likely stepped on something about 1 week ago and later his foot got redness, warmth and swelling.  no cp, no sob, no abd. pain.  no n/v/d. Last known TDAP within 1 year (09 Sep 2020 19:30)      Podiatry HPI: History as above. 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 He was previous discharged from ED with oral antibiotics however the redness worsened with increased pain to the right foot. Patient does not follow a podiatrist outpatient. Patient endorses tingling sensation in toes. Denies f/c/n/v or SOB.     PMH: Diabetes    Allergies: No Known Allergies    MEDICATIONS  (STANDING):  dextrose 5%. 1000 milliLiter(s) (50 mL/Hr) IV Continuous <Continuous>  dextrose 50% Injectable 12.5 Gram(s) IV Push once  dextrose 50% Injectable 25 Gram(s) IV Push once  dextrose 50% Injectable 25 Gram(s) IV Push once  enoxaparin Injectable 40 milliGRAM(s) SubCutaneous daily  influenza   Vaccine 0.5 milliLiter(s) IntraMuscular once  insulin glargine Injectable (LANTUS) 10 Unit(s) SubCutaneous at bedtime  insulin lispro (HumaLOG) corrective regimen sliding scale   SubCutaneous three times a day before meals  insulin lispro (HumaLOG) corrective regimen sliding scale   SubCutaneous at bedtime  insulin lispro Injectable (HumaLOG) 3 Unit(s) SubCutaneous three times a day before meals  piperacillin/tazobactam IVPB.. 3.375 Gram(s) IV Intermittent every 8 hours  saccharomyces boulardii 250 milliGRAM(s) Oral two times a day  vancomycin  IVPB 1250 milliGRAM(s) IV Intermittent every 8 hours    MEDICATIONS  (PRN):  acetaminophen   Tablet .. 650 milliGRAM(s) Oral every 6 hours PRN Temp greater or equal to 38C (100.4F), Mild Pain (1 - 3), Moderate Pain (4 - 6)  dextrose 40% Gel 15 Gram(s) Oral once PRN Blood Glucose LESS THAN 70 milliGRAM(s)/deciliter  glucagon  Injectable 1 milliGRAM(s) IntraMuscular once PRN Glucose LESS THAN 70 milligrams/deciliter  oxyCODONE    IR 5 milliGRAM(s) Oral every 12 hours PRN Severe Pain (7 - 10)      FH:Family history of diabetes mellitus (DM)    PSX: S/P tonsillectomy    SH: acetaminophen   Tablet .. 650 milliGRAM(s) Oral every 6 hours PRN  dextrose 40% Gel 15 Gram(s) Oral once PRN  dextrose 5%. 1000 milliLiter(s) IV Continuous <Continuous>  dextrose 50% Injectable 12.5 Gram(s) IV Push once  dextrose 50% Injectable 25 Gram(s) IV Push once  dextrose 50% Injectable 25 Gram(s) IV Push once  enoxaparin Injectable 40 milliGRAM(s) SubCutaneous daily  glucagon  Injectable 1 milliGRAM(s) IntraMuscular once PRN  influenza   Vaccine 0.5 milliLiter(s) IntraMuscular once  insulin glargine Injectable (LANTUS) 10 Unit(s) SubCutaneous at bedtime  insulin lispro (HumaLOG) corrective regimen sliding scale   SubCutaneous three times a day before meals  insulin lispro (HumaLOG) corrective regimen sliding scale   SubCutaneous at bedtime  insulin lispro Injectable (HumaLOG) 3 Unit(s) SubCutaneous three times a day before meals  oxyCODONE    IR 5 milliGRAM(s) Oral every 12 hours PRN  piperacillin/tazobactam IVPB.. 3.375 Gram(s) IV Intermittent every 8 hours  saccharomyces boulardii 250 milliGRAM(s) Oral two times a day  vancomycin  IVPB 1000 milliGRAM(s) IV Intermittent every 8 hours    Vital Signs Last 24 Hrs  T(C): 36.7 (13 Sep 2020 16:26), Max: 36.7 (13 Sep 2020 16:26)  T(F): 98.1 (13 Sep 2020 16:26), Max: 98.1 (13 Sep 2020 16:26)  HR: 94 (13 Sep 2020 16:26) (69 - 94)  BP: 123/78 (13 Sep 2020 16:26) (123/78 - 130/80)  BP(mean): --  RR: 20 (13 Sep 2020 16:26) (18 - 20)  SpO2: 96% (13 Sep 2020 08:11) (96% - 97%)                          15.7   9.95  )-----------( 246      ( 13 Sep 2020 09:05 )             45.9   09-13    138  |  100  |  13.0  ----------------------------<  201<H>  4.4   |  25.0  |  0.54    Ca    9.4      13 Sep 2020 09:05  Mg     2.0     09-12    TPro  7.6  /  Alb  3.6  /  TBili  0.3<L>  /  DBili  x   /  AST  38  /  ALT  25  /  AlkPhos  77  09-13        PHYSICAL EXAM  GEN: GABRIEL SCHMIDT is a pleasant well-nourished, well developed 39y Male in no acute distress, alert awake, and oriented to person, place and time.   LE Focused:    Vasc: DP/PT palpable, CFT brisk to digit. skin temperature warm to warm. Right foot warmer compared to left  Derm: Ertythema edema noted to the lateral dorsum of the foot, persistenet. digits are edematous, 4th webspace maceration with open lesion that is superficial does not track or probe deep. Dorsal blister at the base of 3rd toe. No discharge or malodor. Superfical ulcer post debridment measuring 1.5cm x 1cm x 0.1cm plantar without surround erythema, no tracking or tunneling, healing well. Negative probe to bone. Small hyperkeratotic lesion noted plantar hallux. No other lesions or rash noted. Overall edema improved  Neuro: diminished protective sensation.   MSK: Pain to palpation to the right foot, patient guarding. No gross deformity noted    Imaging:   EXAM:  FOOT-RIGHT                          PROCEDURE DATE:  09/09/2020      INTERPRETATION:  RIGHT foot    CLINICAL INFORMATION:Injury with  Pain.  Comparison: 9/5/2020] foot radiographs  TECHNIQUE: AP,lateral and oblique views.    FINDINGS:    The bones and joint spaces are preserved.  There are no fractures or dislocations.  There is mild diffuse soft tissue swelling.    IMPRESSION:  Soft tissue swelling.  No acute radiographic osseous pathology.      KEVYN LIN M.D., ATTENDING RADIOLOGIST  This document has been electronically signed. Sep  9 2020  4:57P      Cultures: Right foot abscess culture sent to lab.     A: Right foot cellulitis       s/p Bedside I&D 9/11    P:   Chart reviewed and Patient evaluated  Discussed diagnosis and treatment with patient  Obtained wound culture to be sent to Lab, f/u result  Vanco trough low 4.7  Wound flushed with copious saline betadine mixture and packed with iodoform packing  Applied betadine with dry sterile dressing   X-rays reviewed: negative study  Continue with IV antibiotics As Per ID  f/u wound culture and sensitivity  Weight bearing as tolerated to Right heel in surgical shoe  A1c 12.6, may consider endocrine consult, patient does not have endocrinologist  continue close monitoring of erythema, erythema improving  Podiatry will follow while in house.  Discussed  with Attending Dr. Carranza   
Patient is a 39y old  Male who presents with a chief complaint of rt. foot cellulitis (10 Sep 2020 12:18)    Podiatry Interval HPI: Patient was seen bedside resting comfortable in NAD. No acute event overnight, no new complaints. Has continued pain in the right foot, little improvement. Patient denies f/c/n/v or SOB. Dressing Intact.     HPI:  pt. is a 40 y/o male with h/o DM presents to ER for rt. foot pain, redness and warmth that is worsening over past 6 -7  days. Pt was seen at [facility] ER twice over past several days, was initially given Cipro which was then changed to Bactrim/Keflex which he has used but did not get better and cellulitic area is worsening . Reports chills this morning, took motrin 4 hours prior to arrival. pt. works in [employer] business and thinks likely stepped on something about 1 week ago and later his foot got redness, warmth and swelling.  no cp, no sob, no abd. pain.  no n/v/d. Last known TDAP within 1 year (09 Sep 2020 19:30)      Podiatry HPI: History as above. 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 He was previous discharged from ED with oral antibiotics however the redness worsened with increased pain to the right foot. Patient does not follow a podiatrist outpatient. Patient endorses tingling sensation in toes. Denies f/c/n/v or SOB.     PMH: Diabetes    Allergies: No Known Allergies    MEDICATIONS  (STANDING):  dextrose 5%. 1000 milliLiter(s) (50 mL/Hr) IV Continuous <Continuous>  dextrose 50% Injectable 12.5 Gram(s) IV Push once  dextrose 50% Injectable 25 Gram(s) IV Push once  dextrose 50% Injectable 25 Gram(s) IV Push once  enoxaparin Injectable 40 milliGRAM(s) SubCutaneous daily  influenza   Vaccine 0.5 milliLiter(s) IntraMuscular once  insulin glargine Injectable (LANTUS) 10 Unit(s) SubCutaneous at bedtime  insulin lispro (HumaLOG) corrective regimen sliding scale   SubCutaneous three times a day before meals  insulin lispro (HumaLOG) corrective regimen sliding scale   SubCutaneous at bedtime  insulin lispro Injectable (HumaLOG) 3 Unit(s) SubCutaneous three times a day before meals  piperacillin/tazobactam IVPB.. 3.375 Gram(s) IV Intermittent every 8 hours  saccharomyces boulardii 250 milliGRAM(s) Oral two times a day  vancomycin  IVPB 1250 milliGRAM(s) IV Intermittent every 8 hours    MEDICATIONS  (PRN):  acetaminophen   Tablet .. 650 milliGRAM(s) Oral every 6 hours PRN Temp greater or equal to 38C (100.4F), Mild Pain (1 - 3), Moderate Pain (4 - 6)  dextrose 40% Gel 15 Gram(s) Oral once PRN Blood Glucose LESS THAN 70 milliGRAM(s)/deciliter  glucagon  Injectable 1 milliGRAM(s) IntraMuscular once PRN Glucose LESS THAN 70 milligrams/deciliter  oxyCODONE    IR 5 milliGRAM(s) Oral every 12 hours PRN Severe Pain (7 - 10)      FH:Family history of diabetes mellitus (DM)    PSX: S/P tonsillectomy    SH: acetaminophen   Tablet .. 650 milliGRAM(s) Oral every 6 hours PRN  dextrose 40% Gel 15 Gram(s) Oral once PRN  dextrose 5%. 1000 milliLiter(s) IV Continuous <Continuous>  dextrose 50% Injectable 12.5 Gram(s) IV Push once  dextrose 50% Injectable 25 Gram(s) IV Push once  dextrose 50% Injectable 25 Gram(s) IV Push once  enoxaparin Injectable 40 milliGRAM(s) SubCutaneous daily  glucagon  Injectable 1 milliGRAM(s) IntraMuscular once PRN  influenza   Vaccine 0.5 milliLiter(s) IntraMuscular once  insulin glargine Injectable (LANTUS) 10 Unit(s) SubCutaneous at bedtime  insulin lispro (HumaLOG) corrective regimen sliding scale   SubCutaneous three times a day before meals  insulin lispro (HumaLOG) corrective regimen sliding scale   SubCutaneous at bedtime  insulin lispro Injectable (HumaLOG) 3 Unit(s) SubCutaneous three times a day before meals  oxyCODONE    IR 5 milliGRAM(s) Oral every 12 hours PRN  piperacillin/tazobactam IVPB.. 3.375 Gram(s) IV Intermittent every 8 hours  saccharomyces boulardii 250 milliGRAM(s) Oral two times a day  vancomycin  IVPB 1000 milliGRAM(s) IV Intermittent every 8 hours    Vital Signs Last 24 Hrs  T(C): 36.7 (14 Sep 2020 07:32), Max: 36.7 (13 Sep 2020 16:26)  T(F): 98 (14 Sep 2020 07:32), Max: 98.1 (13 Sep 2020 16:26)  HR: 72 (14 Sep 2020 07:32) (71 - 94)  BP: 130/84 (14 Sep 2020 07:32) (122/75 - 130/84)  BP(mean): --  RR: 20 (14 Sep 2020 07:32) (20 - 20)  SpO2: 97% (14 Sep 2020 07:32) (97% - 98%)    LABS                          15.7   9.95  )-----------( 246      ( 13 Sep 2020 09:05 )             45.9     WBC Count: 9.95 K/uL (09-13 @ 09:05)  WBC Count: 9.36 K/uL (09-12 @ 07:49)  WBC Count: 12.78 K/uL (09-11 @ 08:45)  WBC Count: 12.93 K/uL (09-10 @ 08:10)  WBC Count: 16.41 K/uL (09-09 @ 17:49)    09-13    138  |  100  |  13.0  ----------------------------<  201<H>  4.4   |  25.0  |  0.54    Ca    9.4      13 Sep 2020 09:05    TPro  7.6  /  Alb  3.6  /  TBili  0.3<L>  /  DBili  x   /  AST  38  /  ALT  25  /  AlkPhos  77  09-13    Sedimentation Rate, Erythrocyte (09.11.20 @ 16:27)    Sedimentation Rate, Erythrocyte: 12 mm/hr    C-Reactive Protein, Serum (09.11.20 @ 16:27)    C-Reactive Protein, Serum: 6.98 mg/dL      PHYSICAL EXAM  GEN: GABRIEL SCHMIDT is a pleasant well-nourished, well developed 39y Male in no acute distress, alert awake, and oriented to person, place and time.   LE Focused:    Vasc: DP/PT palpable, CFT brisk to digit. skin temperature warm to warm. Right foot warmer compared to left  Derm: Ertythema edema noted to the lateral dorsum aspect of the foot improved, 3rd webspace maceration with open lesion that is superficial does not track or probe deep. Dorsal blister at the base of 3rd toe. No discharge or malodor. Superficial ulcer post debridement measuring 1.5cm x 1cm x 0.1cm plantar without surround erythema, no tracking or tunneling, healing well. Negative probe to bone. Small hyperkeratotic lesion noted plantar hallux. No other lesions or rash noted. Overall edema improved  Neuro: diminished protective sensation.   MSK: Pain to palpation to the right foot, patient guarding. No gross deformity noted    Imaging:   EXAM:  FOOT-RIGHT                          PROCEDURE DATE:  09/09/2020      INTERPRETATION:  RIGHT foot    CLINICAL INFORMATION:Injury with  Pain.  Comparison: 9/5/2020] foot radiographs  TECHNIQUE: AP,lateral and oblique views.    FINDINGS:    The bones and joint spaces are preserved.  There are no fractures or dislocations.  There is mild diffuse soft tissue swelling.    IMPRESSION:  Soft tissue swelling.  No acute radiographic osseous pathology.      KEVYN LIN M.D., ATTENDING RADIOLOGIST  This document has been electronically signed. Sep  9 2020  4:57P    Culture - Surgical Swab (09.10.20 @ 22:15)    Specimen Source: .Surgical Swab right foot abscess    Culture Results:   Rare Normal skin brittany isolated        A: Right foot cellulitis       s/p Bedside I&D 9/11    P:   Chart reviewed and Patient evaluated  Discussed diagnosis and treatment with patient  Obtained wound culture as above   X-rays reviewed: negative study  Discussed with medicine - no MRI at this time  Continue with IV antibiotics As Per ID  Wound packed and applied betadine with dry sterile dressing   Weight bearing as tolerated to Right heel in surgical shoe  A1c 12.6, may consider endocrine consult, patient does not have endocrinologist  Continue close monitoring of erythema, erythema improving  Podiatry will follow while in house.  Seen with Attending Dr. Benson   
Patient is a 39y old  Male who presents with a chief complaint of rt. foot cellulitis (10 Sep 2020 12:18)    Podiatry Interval HPI: Patient was seen bedside with attending resting comfortable in NAD. No acute event overnight, no new complaints. Has continued pain in the right foot, little improvement. Patient denies f/c/n/v or SOB. Dressing Intact.     HPI:  pt. is a 40 y/o male with h/o DM presents to ER for rt. foot pain, redness and warmth that is worsening over past 6 -7  days. Pt was seen at [facility] ER twice over past several days, was initially given Cipro which was then changed to Bactrim/Keflex which he has used but did not get better and cellulitic area is worsening . Reports chills this morning, took motrin 4 hours prior to arrival. pt. works in [employer] business and thinks likely stepped on something about 1 week ago and later his foot got redness, warmth and swelling.  no cp, no sob, no abd. pain.  no n/v/d. Last known TDAP within 1 year (09 Sep 2020 19:30)      Podiatry HPI: History as above. 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 He was previous discharged from ED with oral antibiotics however the redness worsened with increased pain to the right foot. Patient does not follow a podiatrist outpatient. Patient endorses tingling sensation in toes. Denies f/c/n/v or SOB.     PMH: Diabetes    Allergies: No Known Allergies    MEDICATIONS  (STANDING):  dextrose 5%. 1000 milliLiter(s) (50 mL/Hr) IV Continuous <Continuous>  dextrose 50% Injectable 12.5 Gram(s) IV Push once  dextrose 50% Injectable 25 Gram(s) IV Push once  dextrose 50% Injectable 25 Gram(s) IV Push once  enoxaparin Injectable 40 milliGRAM(s) SubCutaneous daily  influenza   Vaccine 0.5 milliLiter(s) IntraMuscular once  insulin glargine Injectable (LANTUS) 10 Unit(s) SubCutaneous at bedtime  insulin lispro (HumaLOG) corrective regimen sliding scale   SubCutaneous three times a day before meals  insulin lispro (HumaLOG) corrective regimen sliding scale   SubCutaneous at bedtime  insulin lispro Injectable (HumaLOG) 3 Unit(s) SubCutaneous three times a day before meals  piperacillin/tazobactam IVPB.. 3.375 Gram(s) IV Intermittent every 8 hours  saccharomyces boulardii 250 milliGRAM(s) Oral two times a day  vancomycin  IVPB 1250 milliGRAM(s) IV Intermittent every 8 hours    MEDICATIONS  (PRN):  acetaminophen   Tablet .. 650 milliGRAM(s) Oral every 6 hours PRN Temp greater or equal to 38C (100.4F), Mild Pain (1 - 3), Moderate Pain (4 - 6)  dextrose 40% Gel 15 Gram(s) Oral once PRN Blood Glucose LESS THAN 70 milliGRAM(s)/deciliter  glucagon  Injectable 1 milliGRAM(s) IntraMuscular once PRN Glucose LESS THAN 70 milligrams/deciliter  oxyCODONE    IR 5 milliGRAM(s) Oral every 12 hours PRN Severe Pain (7 - 10)      FH:Family history of diabetes mellitus (DM)    PSX: S/P tonsillectomy    SH: acetaminophen   Tablet .. 650 milliGRAM(s) Oral every 6 hours PRN  dextrose 40% Gel 15 Gram(s) Oral once PRN  dextrose 5%. 1000 milliLiter(s) IV Continuous <Continuous>  dextrose 50% Injectable 12.5 Gram(s) IV Push once  dextrose 50% Injectable 25 Gram(s) IV Push once  dextrose 50% Injectable 25 Gram(s) IV Push once  enoxaparin Injectable 40 milliGRAM(s) SubCutaneous daily  glucagon  Injectable 1 milliGRAM(s) IntraMuscular once PRN  influenza   Vaccine 0.5 milliLiter(s) IntraMuscular once  insulin glargine Injectable (LANTUS) 10 Unit(s) SubCutaneous at bedtime  insulin lispro (HumaLOG) corrective regimen sliding scale   SubCutaneous three times a day before meals  insulin lispro (HumaLOG) corrective regimen sliding scale   SubCutaneous at bedtime  insulin lispro Injectable (HumaLOG) 3 Unit(s) SubCutaneous three times a day before meals  oxyCODONE    IR 5 milliGRAM(s) Oral every 12 hours PRN  piperacillin/tazobactam IVPB.. 3.375 Gram(s) IV Intermittent every 8 hours  saccharomyces boulardii 250 milliGRAM(s) Oral two times a day  vancomycin  IVPB 1000 milliGRAM(s) IV Intermittent every 8 hours                            15.7   12.78 )-----------( 233      ( 11 Sep 2020 08:45 )             46.5       09-11    135  |  101  |  17.0  ----------------------------<  167<H>  3.9   |  18.0<L>  |  0.59    Ca    9.4      11 Sep 2020 08:44  Mg     1.9     09-11    TPro  7.5  /  Alb  3.4  /  TBili  0.5  /  DBili  x   /  AST  26  /  ALT  17  /  AlkPhos  78  09-11      Vital Signs Last 24 Hrs  T(C): 36.8 (11 Sep 2020 09:13), Max: 37.2 (10 Sep 2020 23:52)  T(F): 98.2 (11 Sep 2020 09:13), Max: 98.9 (10 Sep 2020 23:52)  HR: 108 (11 Sep 2020 09:13) (87 - 108)  BP: 143/80 (11 Sep 2020 09:13) (138/90 - 143/88)  BP(mean): --  RR: 18 (11 Sep 2020 09:13) (18 - 18)  SpO2: 94% (10 Sep 2020 23:52) (94% - 98%)            PHYSICAL EXAM  GEN: GABRIEL SCHMIDT is a pleasant well-nourished, well developed 39y Male in no acute distress, alert awake, and oriented to person, place and time.   LE Focused:    Vasc: DP/PT palpable, CFT brisk to digit. skin temperature warm to warm. Right foot warmer compared to left  Derm: Ertythema edema noted to the lateral dorsum of the foot, persistenet. digits are edematous, 4th webspace maceration with open lesion that is superficial does not track or probe deep. Dorsal blister at the base of 3rd toe. No discharge or malodor. Superfical ulcer post debridment measuring 1.5cm x 1cm x 0.1cm plantar without surround erythema, no tracking or tunneling, healing well. Negative probe to bone. Small hyperkeratotic lesion noted plantar hallux. No other lesions or rash noted. Overall edema improved  Neuro: diminished protective sensation.   MSK: Pain to palpation to the right foot, patient guarding. No gross deformity noted    Imaging:   EXAM:  FOOT-RIGHT                          PROCEDURE DATE:  09/09/2020      INTERPRETATION:  RIGHT foot    CLINICAL INFORMATION:Injury with  Pain.  Comparison: 9/5/2020] foot radiographs  TECHNIQUE: AP,lateral and oblique views.    FINDINGS:    The bones and joint spaces are preserved.  There are no fractures or dislocations.  There is mild diffuse soft tissue swelling.    IMPRESSION:  Soft tissue swelling.  No acute radiographic osseous pathology.      KEVYN LIN M.D., ATTENDING RADIOLOGIST  This document has been electronically signed. Sep  9 2020  4:57P      Cultures: Right foot abscess culture sent to lab.     A: Right foot cellulitis       s/p Bedside I&D 9/11    P:   Chart reviewed and Patient evaluated  Discussed diagnosis and treatment with patient  Obtained wound culture to be sent to Lab, f/u result  Vanco trough low 4.7  Written consent obtained for bedside I&D  6cc 1% lidocaine just injected, small linear incision was made dorsal Interspace, area bluntly explored, no purulence expressed  Patient tolerated procedure well   Wound flushed with copious saline betadine mixture and packed with iodoform packing  Applied betadine with dry sterile dressing   X-rays reviewed: negative study  Continue with IV antibiotics As Per ID  f/u wound culture and sensitivity  Weight bearing as tolerated to Right heel in surgical shoe  A1c 12.6, may consider endocrine consult, patient does not have endocrinologist  continue close monitoring of erythema  Podiatry will follow while in house.  Discussed and seen with Attending Dr. Carranza
Patient is a 39y old  Male who presents with a chief complaint of rt. foot cellulitis (10 Sep 2020 12:18)    Podiatry Interval HPI: Patient was seen bedside with resting comfortable in NAD. No acute event overnight, no new complaints. s/p midline placement yesterday. pain much improved in the right foot. Patient denies f/c/n/v or SOB. Dressing Intact.     HPI:  pt. is a 38 y/o male with h/o DM presents to ER for rt. foot pain, redness and warmth that is worsening over past 6 -7  days. Pt was seen at [facility] ER twice over past several days, was initially given Cipro which was then changed to Bactrim/Keflex which he has used but did not get better and cellulitic area is worsening . Reports chills this morning, took motrin 4 hours prior to arrival. pt. works in [employer] business and thinks likely stepped on something about 1 week ago and later his foot got redness, warmth and swelling.  no cp, no sob, no abd. pain.  no n/v/d. Last known TDAP within 1 year (09 Sep 2020 19:30)      Podiatry HPI: History as above. 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 He was previous discharged from ED with oral antibiotics however the redness worsened with increased pain to the right foot. Patient does not follow a podiatrist outpatient. Patient endorses tingling sensation in toes. Denies f/c/n/v or SOB.     PMH: Diabetes    Allergies: No Known Allergies    MEDICATIONS  (STANDING):  MEDICATIONS  (STANDING):  dextrose 5%. 1000 milliLiter(s) (50 mL/Hr) IV Continuous <Continuous>  dextrose 50% Injectable 12.5 Gram(s) IV Push once  dextrose 50% Injectable 25 Gram(s) IV Push once  dextrose 50% Injectable 25 Gram(s) IV Push once  enoxaparin Injectable 40 milliGRAM(s) SubCutaneous daily  insulin glargine Injectable (LANTUS) 20 Unit(s) SubCutaneous at bedtime  insulin lispro (HumaLOG) corrective regimen sliding scale   SubCutaneous three times a day before meals  insulin lispro (HumaLOG) corrective regimen sliding scale   SubCutaneous at bedtime  insulin lispro Injectable (HumaLOG) 7 Unit(s) SubCutaneous three times a day before meals  oxycodone    5 mG/acetaminophen 325 mG 1 Tablet(s) Oral every 4 hours  piperacillin/tazobactam IVPB.. 3.375 Gram(s) IV Intermittent every 8 hours  saccharomyces boulardii 250 milliGRAM(s) Oral two times a day    MEDICATIONS  (PRN):  acetaminophen   Tablet .. 650 milliGRAM(s) Oral every 6 hours PRN Temp greater or equal to 38C (100.4F), Mild Pain (1 - 3), Moderate Pain (4 - 6)  dextrose 40% Gel 15 Gram(s) Oral once PRN Blood Glucose LESS THAN 70 milliGRAM(s)/deciliter  glucagon  Injectable 1 milliGRAM(s) IntraMuscular once PRN Glucose LESS THAN 70 milligrams/deciliter      FH: Family history of diabetes mellitus (DM)    PSX: S/P tonsillectomy    SH: acetaminophen   Tablet .. 650 milliGRAM(s) Oral every 6 hours PRN  dextrose 40% Gel 15 Gram(s) Oral once PRN  dextrose 5%. 1000 milliLiter(s) IV Continuous <Continuous>  dextrose 50% Injectable 12.5 Gram(s) IV Push once  dextrose 50% Injectable 25 Gram(s) IV Push once  dextrose 50% Injectable 25 Gram(s) IV Push once  enoxaparin Injectable 40 milliGRAM(s) SubCutaneous daily  glucagon  Injectable 1 milliGRAM(s) IntraMuscular once PRN  influenza   Vaccine 0.5 milliLiter(s) IntraMuscular once  insulin glargine Injectable (LANTUS) 10 Unit(s) SubCutaneous at bedtime  insulin lispro (HumaLOG) corrective regimen sliding scale   SubCutaneous three times a day before meals  insulin lispro (HumaLOG) corrective regimen sliding scale   SubCutaneous at bedtime  insulin lispro Injectable (HumaLOG) 3 Unit(s) SubCutaneous three times a day before meals  oxyCODONE    IR 5 milliGRAM(s) Oral every 12 hours PRN  piperacillin/tazobactam IVPB.. 3.375 Gram(s) IV Intermittent every 8 hours  saccharomyces boulardii 250 milliGRAM(s) Oral two times a day  vancomycin  IVPB 1000 milliGRAM(s) IV Intermittent every 8 hours          Vital Signs Last 24 Hrs  T(C): 36.8 (15 Sep 2020 07:33), Max: 36.9 (14 Sep 2020 15:39)  T(F): 98.2 (15 Sep 2020 07:33), Max: 98.5 (14 Sep 2020 15:39)  HR: 77 (15 Sep 2020 07:33) (76 - 96)  BP: 153/97 (15 Sep 2020 07:33) (127/83 - 153/97)  BP(mean): --  RR: 18 (15 Sep 2020 07:33) (16 - 20)  SpO2: 95% (15 Sep 2020 07:33) (95% - 95%)    Sedimentation Rate, Erythrocyte: 12 mm/hr (09-11-20 @ 16:27)      C-Reactive Protein, Serum: 6.98 mg/dL (09-11-20 @ 16:27)    PHYSICAL EXAM  GEN: GABRIEL SCHMIDT is a pleasant well-nourished, well developed 39y Male in no acute distress, alert awake, and oriented to person, place and time.   LE Focused:    Vasc: DP/PT palpable, CFT brisk to digit. skin temperature warm to warm. Right foot warmer compared to left  Derm: Ertythema edema noted much improved, 3rd webspace open wound with dorsal incision, fibrogranular base. No discharge or malodor. plantar lesion healing without surround erythema, no tracking or tunneling, healing well. Negative probe to bone. Small hyperkeratotic lesion noted plantar hallux. No other lesions or rash noted. Overall edema, erythema significantly improved  Neuro: diminished protective sensation.   MSK: Pain to palpation to the right foot, patient guarding, improved from prior No gross deformity noted    Imaging:   EXAM:  FOOT-RIGHT                          PROCEDURE DATE:  09/09/2020      INTERPRETATION:  RIGHT foot    CLINICAL INFORMATION:Injury with  Pain.  Comparison: 9/5/2020] foot radiographs  TECHNIQUE: AP,lateral and oblique views.    FINDINGS:    The bones and joint spaces are preserved.  There are no fractures or dislocations.  There is mild diffuse soft tissue swelling.    IMPRESSION:  Soft tissue swelling.  No acute radiographic osseous pathology.      KEVYN LIN M.D., ATTENDING RADIOLOGIST  This document has been electronically signed. Sep  9 2020  4:57P    Culture - Surgical Swab (09.10.20 @ 22:15)    Specimen Source: .Surgical Swab right foot abscess    Culture Results:   Rare Normal skin brittany isolated        A: Right foot cellulitis       s/p Bedside I&D 9/11    P:   Chart reviewed and Patient evaluated  Discussed diagnosis and treatment with patient  Obtained wound culture as above   X-rays reviewed: negative study  Discussed with medicine - no MRI at this time  Continue with IV antibiotics As Per ID  Wound dressed with betadine dry sterile dressing   Weight bearing as tolerated to Right heel in surgical shoe  A1c 12.6, may consider endocrine consult, patient does not have endocrinologist  Erythema edema significant improved  Patient is stable for discharge for outpatient follow up with Dr. Carranza within 1 week of discharge  Seen and discussion with Attending Dr. Carranza     Wound Care:   apply betadine soaked gauze to 3rd interspace cover with dry gauze secure with macy and ACE bandange  
[facility]  INFECTIOUS DISEASES AND INTERNAL MEDICINE at [place]  =======================================================  Korey Del Valle MD  Diplomates American Board of Internal Medicine and Infectious Diseases  Tel  [phone]  Fax [phone]  =======================================================    [facility]-[id number]  GABRIEL SCHMIDT  follow up: RIGHT foot cellulitis    s/p de-an of #4 right plantar area  and debridement around #4 and #5 toes.     no fevers  reports less pain in the foot.     =======================================================  REVIEW OF SYSTEMS:  CONSTITUTIONAL:  No Fever or chills  HEENT:  No diplopia or blurred vision.  No earache, sore throat or runny nose.  CARDIOVASCULAR:  No pressure, squeezing, strangling, tightness, heaviness or aching about the chest, neck, axilla or epigastrium.  RESPIRATORY:  No cough, shortness of breath  GASTROINTESTINAL:  No nausea, vomiting or diarrhea.  GENITOURINARY:  No dysuria, frequency or urgency. No Blood in urine  MUSCULOSKELETAL:  no joint aches, no muscle pain  SKIN:  as per HPI  NEUROLOGIC:  No Headaches, seizures or weakness.  PSYCHIATRIC:  No disorder of thought or mood.  ENDOCRINE:  No heat or cold intolerance  HEMATOLOGICAL:  No easy bruising or bleeding.   =======================================================  Allergies  No Known Allergies     ======================================================  Physical Exam:  ============    General:  No acute distress.  Eye: Pupils are equal, round and reactive to light, Extraocular movements are intact, Normal conjunctiva.  HENT: Normocephalic, Oral mucosa is moist, No pharyngeal erythema, No sinus tenderness.  Neck: Supple, No lymphadenopathy.  Respiratory: Lungs are clear to auscultation, Respirations are non-labored.  Cardiovascular: Normal rate, Regular rhythm,   Gastrointestinal: Soft, Non-tender, Non-distended, Normal bowel sounds.  Genitourinary: No costovertebral angle tenderness.  Lymphatics: No lymphadenopathy neck,   Musculoskeletal: Normal range of motion, Normal strength.  Integumentary:   RIGHT FOOT dorsum with erythema,  RIGHT #4 #5 toes, with macerated skin at flexor surface  Neurologic: Alert, Oriented, No focal deficits, Cranial Nerves II-XII are grossly intact.  Psychiatric: Appropriate mood & affect.    =======================================================    Vitals:  ============  T(F): 98.2 (11 Sep 2020 09:13), Max: 98.9 (10 Sep 2020 23:52)  HR: 108 (11 Sep 2020 09:13)  BP: 143/80 (11 Sep 2020 09:13)  RR: 18 (11 Sep 2020 09:13)  SpO2: 94% (10 Sep 2020 23:52) (94% - 98%)  temp max in last 48H T(F): , Max: 99 (09-09-20 @ 16:41)    =======================================================  Current Antibiotics:  piperacillin/tazobactam IVPB.. 3.375 Gram(s) IV Intermittent every 8 hours  vancomycin  IVPB 1250 milliGRAM(s) IV Intermittent every 8 hours    Other medications:  dextrose 5%. 1000 milliLiter(s) IV Continuous <Continuous>  dextrose 50% Injectable 12.5 Gram(s) IV Push once  dextrose 50% Injectable 25 Gram(s) IV Push once  dextrose 50% Injectable 25 Gram(s) IV Push once  enoxaparin Injectable 40 milliGRAM(s) SubCutaneous daily  influenza   Vaccine 0.5 milliLiter(s) IntraMuscular once  insulin glargine Injectable (LANTUS) 10 Unit(s) SubCutaneous at bedtime  insulin lispro (HumaLOG) corrective regimen sliding scale   SubCutaneous three times a day before meals  insulin lispro (HumaLOG) corrective regimen sliding scale   SubCutaneous at bedtime  insulin lispro Injectable (HumaLOG) 3 Unit(s) SubCutaneous three times a day before meals  saccharomyces boulardii 250 milliGRAM(s) Oral two times a day      =======================================================  Labs:                        15.7   12.78 )-----------( 233      ( 11 Sep 2020 08:45 )             46.5      09-11    135  |  101  |  17.0  ----------------------------<  167<H>  3.9   |  18.0<L>  |  0.59    Ca    9.4      11 Sep 2020 08:44  Mg     1.9     09-11    TPro  7.5  /  Alb  3.4  /  TBili  0.5  /  DBili  x   /  AST  26  /  ALT  17  /  AlkPhos  78  09-11      Creatinine, Serum: 0.59 mg/dL (09-11-20 @ 08:44)  Creatinine, Serum: 0.55 mg/dL (09-10-20 @ 08:10)  Creatinine, Serum: 0.76 mg/dL (09-09-20 @ 17:49)         WBC Count: 12.78 K/uL (09-11-20 @ 08:45)  WBC Count: 12.93 K/uL (09-10-20 @ 08:10)  WBC Count: 16.41 K/uL (09-09-20 @ 17:49)      COVID-19 PCR: NotDetec (09-09-20 @ 19:23)      Alkaline Phosphatase, Serum: 78 U/L (09-11-20 @ 08:44)  Alkaline Phosphatase, Serum: 99 U/L (09-09-20 @ 17:49)  Alanine Aminotransferase (ALT/SGPT): 17 U/L (09-11-20 @ 08:44)  Alanine Aminotransferase (ALT/SGPT): 21 U/L (09-09-20 @ 17:49)  Aspartate Aminotransferase (AST/SGOT): 26 U/L (09-11-20 @ 08:44)  Aspartate Aminotransferase (AST/SGOT): 31 U/L (09-09-20 @ 17:49)  Bilirubin Total, Serum: 0.5 mg/dL (09-11-20 @ 08:44)  Bilirubin Total, Serum: 0.6 mg/dL (09-09-20 @ 17:49)

## 2020-09-15 NOTE — PROGRESS NOTE ADULT - ASSESSMENT
40 yo Male  with hx of DM on oral meds presented to the ED for worsening foot infection that failed outpatient oral antibiotics. 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, has crack in between toes  XR unremarkable and no dvt on duplex  ID recs appreciated  Podiatry recs appreciated   Bedside I&D was done  Wound cx pending  blood cx neg thus far  prn pain control Tylenol and oxycodone for foot pain  continue broad spectrum abx Zosyn - Will need until 9/22   Patient will require MID-line placement  Vanco discontinued as per ID   PT recommends Home on DC     Uncontrolled DM type 2  on oral meds but not compliant  a1c 12%  Increased to lantus 20 units at bedtime and 7 units premeal, will adjust as needed  ISS and hypoglycemic protocol  Diabetes educator    DVT ppx: Lovenox  Dispo:  DC home today. Pending Home care set up.   Full code

## 2020-09-15 NOTE — PROGRESS NOTE ADULT - REASON FOR ADMISSION
rt. foot cellulitis

## 2020-09-15 NOTE — DISCHARGE NOTE NURSING/CASE MANAGEMENT/SOCIAL WORK - NSDCPETBCESMAN_GEN_ALL_CORE
If you are a smoker, it is important for your health to stop smoking. Please be aware that second hand smoke is also harmful. Home

## 2020-09-15 NOTE — PROGRESS NOTE ADULT - PROVIDER SPECIALTY LIST ADULT
Hospitalist
Infectious Disease
Podiatry
Hospitalist

## 2020-09-15 NOTE — DISCHARGE NOTE NURSING/CASE MANAGEMENT/SOCIAL WORK - PATIENT PORTAL LINK FT
You can access the FollowMyHealth Patient Portal offered by [facility] by registering at the following website: [email or web address]. By joining [employer]’s FollowMyHealth portal, you will also be able to view your health information using other applications (apps) compatible with our system.

## 2020-10-08 ENCOUNTER — APPOINTMENT (OUTPATIENT)
Dept: ENDOCRINOLOGY | Facility: CLINIC | Age: 40
End: 2020-10-08
Payer: COMMERCIAL

## 2020-10-08 VITALS
SYSTOLIC BLOOD PRESSURE: 126 MMHG | WEIGHT: 245 LBS | DIASTOLIC BLOOD PRESSURE: 90 MMHG | HEIGHT: 72 IN | BODY MASS INDEX: 33.18 KG/M2 | TEMPERATURE: 96.7 F

## 2020-10-08 DIAGNOSIS — Z83.49 FAMILY HISTORY OF OTHER ENDOCRINE, NUTRITIONAL AND METABOLIC DISEASES: ICD-10-CM

## 2020-10-08 DIAGNOSIS — E11.65 TYPE 2 DIABETES MELLITUS WITH HYPERGLYCEMIA: ICD-10-CM

## 2020-10-08 DIAGNOSIS — Z78.9 OTHER SPECIFIED HEALTH STATUS: ICD-10-CM

## 2020-10-08 DIAGNOSIS — L03.119 CELLULITIS OF UNSPECIFIED PART OF LIMB: ICD-10-CM

## 2020-10-08 DIAGNOSIS — Z83.3 FAMILY HISTORY OF DIABETES MELLITUS: ICD-10-CM

## 2020-10-08 DIAGNOSIS — Z87.891 PERSONAL HISTORY OF NICOTINE DEPENDENCE: ICD-10-CM

## 2020-10-08 PROCEDURE — 99202 OFFICE O/P NEW SF 15 MIN: CPT

## 2020-10-08 RX ORDER — PEN NEEDLE, DIABETIC 32GX 5/32"
32G X 4 MM NEEDLE, DISPOSABLE MISCELLANEOUS
Qty: 4 | Refills: 1 | Status: ACTIVE | COMMUNITY
Start: 2020-10-08 | End: 1900-01-01

## 2020-10-08 RX ORDER — PEN NEEDLE, DIABETIC 29 G X1/2"
32G X 4 MM NEEDLE, DISPOSABLE MISCELLANEOUS
Qty: 400 | Refills: 0 | Status: ACTIVE | COMMUNITY

## 2020-10-08 NOTE — REVIEW OF SYSTEMS
[Anxiety] : anxiety [Fatigue] : no fatigue [Decreased Appetite] : appetite not decreased [Recent Weight Gain (___ Lbs)] : no recent weight gain [Recent Weight Loss (___ Lbs)] : no recent weight loss [Blurred Vision] : no blurred vision [Dysphagia] : no dysphagia [Chest Pain] : no chest pain [Palpitations] : no palpitations [Nausea] : no nausea [Abdominal Pain] : no abdominal pain [Polyuria] : no polyuria [Dysuria] : no dysuria [Joint Pain] : no joint pain [Headaches] : no headaches [Dizziness] : no dizziness [Depression] : no depression [Polydipsia] : no polydipsia

## 2020-10-08 NOTE — HISTORY OF PRESENT ILLNESS
[FreeTextEntry1] : Patient presents today for evaluation of diabetes s/p admission at [facility] for cellulitis of the right foot. Presented to ER with right foot pain, redness, and warmth, worsening over 6-7 days. Was initially discharged from ER on PO antibiotics but then returned as symptoms did not improve and affected area enlarged. 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 Wound was debrided and he is now following as outpatient with podiatry.\par \par Found to have A1c 12.6% on admission. Up until now, diabetes has been managed by PCP on metformin, glipizide, and Xigduo. Stopped taking all medication several months ago due to lapse in insurance, and then "just got reckless."\par \par Type: 2\par Severity: severe\par Duration: diagnosed 5-7 years ago\par Onset: decreased energy, hot/cold flashes, increased thirst and urination prompted evaluation by PCP\par \par Associated symptoms-\par Last eye exam: April 2020, no DR per patient.\par Last foot exam: October 2020 for cellulitis underneath 3rd and 4th digits.\par Kidney disease: none\par Heart disease: none\par \par Modifying factors-\par Current meds for glycemic control:\par Lantus 20 units daily\par Humalog 7 units TID AC\par \par Diet: paying more attention to meals, balancing meals. Cut out regular soda. Trying to eat protein, vegetable, and starch for each meal. Portion control. Eating more lean meats, green vegetables. Snacks between meals- pepperoni and cheese.\par Weight: stable, lost 100 pounds over the past 2-3 years\par Exercise: rides mountain bike 3x per week, dirt bikes\par \par SMBG before each meal with OneTouch Ultra. Reports BG has been improving since stopping antibiotics, now 80s to 130s. In the past month, reports low of 68 (took AM Humalog but then breakfast was delayed) and high of 207.\par \par PMH\par cellulitis\par \par PSH\par tonsillectomy\par perforated ear drum\par repair of deviated septum\par hip surgery\par \par FH\par Father with diabetes\par \par Social\par Employed: construction, fire and water restoration\par Tobacco: quit one year ago\par Alcohol: holidays\par Drugs: occasional marijuana

## 2020-10-08 NOTE — ASSESSMENT
[Diabetes Foot Care] : diabetes foot care [Carbohydrate Consistent Diet] : carbohydrate consistent diet [Importance of Diet and Exercise] : importance of diet and exercise to improve glycemic control, achieve weight loss and improve cardiovascular health [Self Monitoring of Blood Glucose] : self monitoring of blood glucose [Retinopathy Screening] : Patient was referred to ophthalmology for retinopathy screening [FreeTextEntry1] : 39 year old male uncontrolled T2DM and associated cellulitis. BG is improving on insulin.\par -Continue current insulin regimen.\par -Call office with highs/lows.\par -Reviewed risks/complications of uncontrolled diabetes.\par -Discussed importance of lifestyle modifications- diet, exercise, and weight loss- for improved glycemic control.\par -Saw CDE in hospital and does not feel he needs follow-up at this time.\par -Annual dilated eye exam and podiatry exam.\par -Continue SMBG 3x daily, before each meal. Record readings on log sheets and send to office in one week for insulin adjustment.\par -RTO and repeat A1c in 6 weeks.\par -Plan to eventually wean off insulin and resume oral medications.

## 2020-10-08 NOTE — PHYSICAL EXAM
[Alert] : alert [Well Nourished] : well nourished [No Acute Distress] : no acute distress [Well Developed] : well developed [Normal Sclera/Conjunctiva] : normal sclera/conjunctiva [EOMI] : extra ocular movement intact [No Proptosis] : no proptosis [Thyroid Not Enlarged] : the thyroid was not enlarged [No Thyroid Nodules] : no palpable thyroid nodules [No Respiratory Distress] : no respiratory distress [No Accessory Muscle Use] : no accessory muscle use [Clear to Auscultation] : lungs were clear to auscultation bilaterally [Normal S1, S2] : normal S1 and S2 [Normal Rate] : heart rate was normal [Regular Rhythm] : with a regular rhythm [No Edema] : no peripheral edema [Not Distended] : not distended [Soft] : abdomen soft [Normal Anterior Cervical Nodes] : no anterior cervical lymphadenopathy [Normal Posterior Cervical Nodes] : no posterior cervical lymphadenopathy [No Spinal Tenderness] : no spinal tenderness [Spine Straight] : spine straight [No Stigmata of Cushings Syndrome] : no stigmata of Cushings Syndrome [Oriented x3] : oriented to person, place, and time [Normal Affect] : the affect was normal [Normal Mood] : the mood was normal [Acanthosis Nigricans] : no acanthosis nigricans

## 2020-11-10 RX ORDER — LANCETS
EACH MISCELLANEOUS
Qty: 300 | Refills: 0 | Status: ACTIVE | COMMUNITY
Start: 2020-11-09 | End: 1900-01-01

## 2020-11-19 ENCOUNTER — APPOINTMENT (OUTPATIENT)
Dept: ENDOCRINOLOGY | Facility: CLINIC | Age: 40
End: 2020-11-19

## 2020-12-01 RX ORDER — INSULIN LISPRO 100 [IU]/ML
100 INJECTION, SOLUTION INTRAVENOUS; SUBCUTANEOUS
Qty: 5 | Refills: 0 | Status: ACTIVE | COMMUNITY
Start: 1900-01-01 | End: 1900-01-01

## 2020-12-01 RX ORDER — INSULIN GLARGINE 100 [IU]/ML
100 INJECTION, SOLUTION SUBCUTANEOUS
Qty: 5 | Refills: 0 | Status: ACTIVE | COMMUNITY
Start: 1900-01-01 | End: 1900-01-01

## 2020-12-01 RX ORDER — BLOOD SUGAR DIAGNOSTIC
STRIP MISCELLANEOUS
Qty: 3 | Refills: 0 | Status: ACTIVE | COMMUNITY
Start: 2020-11-09 | End: 1900-01-01

## 2020-12-11 ENCOUNTER — APPOINTMENT (OUTPATIENT)
Dept: ENDOCRINOLOGY | Facility: CLINIC | Age: 40
End: 2020-12-11

## 2021-01-09 ENCOUNTER — OUTPATIENT (OUTPATIENT)
Dept: OUTPATIENT SERVICES | Facility: HOSPITAL | Age: 41
LOS: 1 days | End: 2021-01-09
Payer: COMMERCIAL

## 2021-01-09 DIAGNOSIS — Z20.828 CONTACT WITH AND (SUSPECTED) EXPOSURE TO OTHER VIRAL COMMUNICABLE DISEASES: ICD-10-CM

## 2021-01-09 DIAGNOSIS — Z90.89 ACQUIRED ABSENCE OF OTHER ORGANS: Chronic | ICD-10-CM

## 2021-01-09 LAB — SARS-COV-2 RNA SPEC QL NAA+PROBE: SIGNIFICANT CHANGE UP

## 2021-01-09 PROCEDURE — C9803: CPT

## 2021-01-09 PROCEDURE — U0005: CPT

## 2021-01-09 PROCEDURE — U0003: CPT

## 2021-01-10 DIAGNOSIS — Z20.828 CONTACT WITH AND (SUSPECTED) EXPOSURE TO OTHER VIRAL COMMUNICABLE DISEASES: ICD-10-CM

## 2021-10-22 NOTE — PHYSICAL THERAPY INITIAL EVALUATION ADULT - SITTING BALANCE: DYNAMIC
Veronica Manning's office pt is already a current pt of Cuco Rasheed. She doesn't need to see him until 4/22. Her thoracic aneurysm is stable. Of Note Abdominal aneurysm need to see Vascular surgery.
home
normal balance

## 2022-02-28 NOTE — ED ADULT TRIAGE NOTE - RESPIRATORY RATE (BREATHS/MIN)
17 Additional Prescription Justification Text: If there is any interruption in treatment exceeding 5 days please see Decay and Dose Adjustment Calculation and complete treatment under Prescription 2, 3 or 4 as appropriate.

## 2023-02-10 NOTE — ED ADULT TRIAGE NOTE - INTERNATIONAL TRAVEL
No
Assistance OOB with selected safe patient handling equipment/Assistance with ambulation/Communicate Fall Risk and Risk Factors to all staff, patient, and family/Reinforce activity limits and safety measures with patient and family/Visual Cue: Yellow wristband/Bed in lowest position, wheels locked, appropriate side rails in place/Call bell, personal items and telephone in reach/Instruct patient to call for assistance before getting out of bed or chair/Non-slip footwear when patient is out of bed/[place] to call system/Physically safe environment - no spills, clutter or unnecessary equipment/Purposeful Proactive Rounding/Room/bathroom lighting operational, light cord in reach

## 2023-06-08 NOTE — H&P ADULT - PROBLEM SELECTOR PLAN 1
pt. will be on vanco, zosyn , follow cultures. ER has called ID and podiatry consults. elevation of RLE. NULL

## 2023-08-03 ENCOUNTER — EMERGENCY (EMERGENCY)
Facility: HOSPITAL | Age: 43
LOS: 1 days | Discharge: HOME | End: 2023-08-03
Attending: EMERGENCY MEDICINE
Payer: COMMERCIAL

## 2023-08-03 VITALS
SYSTOLIC BLOOD PRESSURE: 129 MMHG | WEIGHT: 203.05 LBS | HEIGHT: 70 IN | HEART RATE: 112 BPM | RESPIRATION RATE: 18 BRPM | TEMPERATURE: 98 F | OXYGEN SATURATION: 99 % | DIASTOLIC BLOOD PRESSURE: 84 MMHG

## 2023-08-03 DIAGNOSIS — Z90.89 ACQUIRED ABSENCE OF OTHER ORGANS: Chronic | ICD-10-CM

## 2023-08-03 LAB
ALBUMIN SERPL ELPH-MCNC: 4.6 G/DL — SIGNIFICANT CHANGE UP (ref 3.3–5.2)
ALP SERPL-CCNC: 71 U/L — SIGNIFICANT CHANGE UP (ref 40–120)
ALT FLD-CCNC: 23 U/L — SIGNIFICANT CHANGE UP
ANION GAP SERPL CALC-SCNC: 14 MMOL/L — SIGNIFICANT CHANGE UP (ref 5–17)
ANION GAP SERPL CALC-SCNC: 15 MMOL/L — SIGNIFICANT CHANGE UP (ref 5–17)
ANION GAP SERPL CALC-SCNC: 23 MMOL/L — HIGH (ref 5–17)
APPEARANCE UR: ABNORMAL
AST SERPL-CCNC: 38 U/L — SIGNIFICANT CHANGE UP
B-OH-BUTYR SERPL-SCNC: 4.1 MMOL/L — HIGH
BABESIA MICROTI PCR, BLD RESULT: SIGNIFICANT CHANGE UP
BACTERIA # UR AUTO: ABNORMAL
BASE EXCESS BLDV CALC-SCNC: -5.5 MMOL/L — LOW (ref -2–3)
BASOPHILS # BLD AUTO: 0.06 K/UL — SIGNIFICANT CHANGE UP (ref 0–0.2)
BASOPHILS NFR BLD AUTO: 0.7 % — SIGNIFICANT CHANGE UP (ref 0–2)
BILIRUB SERPL-MCNC: 0.8 MG/DL — SIGNIFICANT CHANGE UP (ref 0.4–2)
BILIRUB UR-MCNC: NEGATIVE — SIGNIFICANT CHANGE UP
BUN SERPL-MCNC: 24.9 MG/DL — HIGH (ref 8–20)
BUN SERPL-MCNC: 27.9 MG/DL — HIGH (ref 8–20)
BUN SERPL-MCNC: 30.2 MG/DL — HIGH (ref 8–20)
CA-I SERPL-SCNC: 0.96 MMOL/L — LOW (ref 1.15–1.33)
CALCIUM SERPL-MCNC: 8.7 MG/DL — SIGNIFICANT CHANGE UP (ref 8.4–10.5)
CALCIUM SERPL-MCNC: 9.7 MG/DL — SIGNIFICANT CHANGE UP (ref 8.4–10.5)
CALCIUM SERPL-MCNC: 9.7 MG/DL — SIGNIFICANT CHANGE UP (ref 8.4–10.5)
CHLORIDE BLDV-SCNC: 99 MMOL/L — SIGNIFICANT CHANGE UP (ref 96–108)
CHLORIDE SERPL-SCNC: 100 MMOL/L — SIGNIFICANT CHANGE UP (ref 96–108)
CHLORIDE SERPL-SCNC: 103 MMOL/L — SIGNIFICANT CHANGE UP (ref 96–108)
CHLORIDE SERPL-SCNC: 91 MMOL/L — LOW (ref 96–108)
CO2 SERPL-SCNC: 20 MMOL/L — LOW (ref 22–29)
CO2 SERPL-SCNC: 21 MMOL/L — LOW (ref 22–29)
CO2 SERPL-SCNC: 23 MMOL/L — SIGNIFICANT CHANGE UP (ref 22–29)
COLOR SPEC: YELLOW — SIGNIFICANT CHANGE UP
CREAT SERPL-MCNC: 0.79 MG/DL — SIGNIFICANT CHANGE UP (ref 0.5–1.3)
CREAT SERPL-MCNC: 0.79 MG/DL — SIGNIFICANT CHANGE UP (ref 0.5–1.3)
CREAT SERPL-MCNC: 0.95 MG/DL — SIGNIFICANT CHANGE UP (ref 0.5–1.3)
DIFF PNL FLD: ABNORMAL
EGFR: 102 ML/MIN/1.73M2 — SIGNIFICANT CHANGE UP
EGFR: 113 ML/MIN/1.73M2 — SIGNIFICANT CHANGE UP
EGFR: 113 ML/MIN/1.73M2 — SIGNIFICANT CHANGE UP
EOSINOPHIL # BLD AUTO: 0.02 K/UL — SIGNIFICANT CHANGE UP (ref 0–0.5)
EOSINOPHIL NFR BLD AUTO: 0.2 % — SIGNIFICANT CHANGE UP (ref 0–6)
EPI CELLS # UR: SIGNIFICANT CHANGE UP
GAS PNL BLDV: 129 MMOL/L — LOW (ref 136–145)
GAS PNL BLDV: SIGNIFICANT CHANGE UP
GLUCOSE BLDV-MCNC: 232 MG/DL — HIGH (ref 70–99)
GLUCOSE SERPL-MCNC: 207 MG/DL — HIGH (ref 70–99)
GLUCOSE SERPL-MCNC: 253 MG/DL — HIGH (ref 70–99)
GLUCOSE SERPL-MCNC: 93 MG/DL — SIGNIFICANT CHANGE UP (ref 70–99)
GLUCOSE UR QL: 1000 MG/DL
HCO3 BLDV-SCNC: 21 MMOL/L — LOW (ref 22–29)
HCT VFR BLD CALC: 45 % — SIGNIFICANT CHANGE UP (ref 39–50)
HCT VFR BLDA CALC: 46 % — SIGNIFICANT CHANGE UP
HGB BLD CALC-MCNC: 15.4 G/DL — SIGNIFICANT CHANGE UP (ref 12.6–17.4)
HGB BLD-MCNC: 16.2 G/DL — SIGNIFICANT CHANGE UP (ref 13–17)
IMM GRANULOCYTES NFR BLD AUTO: 0.5 % — SIGNIFICANT CHANGE UP (ref 0–0.9)
KETONES UR-MCNC: ABNORMAL
LACTATE BLDV-MCNC: 2.1 MMOL/L — HIGH (ref 0.5–2)
LEUKOCYTE ESTERASE UR-ACNC: NEGATIVE — SIGNIFICANT CHANGE UP
LYMPHOCYTES # BLD AUTO: 1.89 K/UL — SIGNIFICANT CHANGE UP (ref 1–3.3)
LYMPHOCYTES # BLD AUTO: 21.4 % — SIGNIFICANT CHANGE UP (ref 13–44)
MCHC RBC-ENTMCNC: 30.1 PG — SIGNIFICANT CHANGE UP (ref 27–34)
MCHC RBC-ENTMCNC: 36 GM/DL — SIGNIFICANT CHANGE UP (ref 32–36)
MCV RBC AUTO: 83.5 FL — SIGNIFICANT CHANGE UP (ref 80–100)
MONOCYTES # BLD AUTO: 0.72 K/UL — SIGNIFICANT CHANGE UP (ref 0–0.9)
MONOCYTES NFR BLD AUTO: 8.2 % — SIGNIFICANT CHANGE UP (ref 2–14)
NEUTROPHILS # BLD AUTO: 6.09 K/UL — SIGNIFICANT CHANGE UP (ref 1.8–7.4)
NEUTROPHILS NFR BLD AUTO: 69 % — SIGNIFICANT CHANGE UP (ref 43–77)
NITRITE UR-MCNC: NEGATIVE — SIGNIFICANT CHANGE UP
PCO2 BLDV: 40 MMHG — LOW (ref 42–55)
PH BLDV: 7.32 — SIGNIFICANT CHANGE UP (ref 7.32–7.43)
PH UR: 5 — SIGNIFICANT CHANGE UP (ref 5–8)
PLATELET # BLD AUTO: 203 K/UL — SIGNIFICANT CHANGE UP (ref 150–400)
PO2 BLDV: 230 MMHG — HIGH (ref 25–45)
POTASSIUM BLDV-SCNC: 4 MMOL/L — SIGNIFICANT CHANGE UP (ref 3.5–5.1)
POTASSIUM SERPL-MCNC: 4.5 MMOL/L — SIGNIFICANT CHANGE UP (ref 3.5–5.3)
POTASSIUM SERPL-MCNC: 4.5 MMOL/L — SIGNIFICANT CHANGE UP (ref 3.5–5.3)
POTASSIUM SERPL-MCNC: 5.8 MMOL/L — HIGH (ref 3.5–5.3)
POTASSIUM SERPL-SCNC: 4.5 MMOL/L — SIGNIFICANT CHANGE UP (ref 3.5–5.3)
POTASSIUM SERPL-SCNC: 4.5 MMOL/L — SIGNIFICANT CHANGE UP (ref 3.5–5.3)
POTASSIUM SERPL-SCNC: 5.8 MMOL/L — HIGH (ref 3.5–5.3)
PROT SERPL-MCNC: 7.8 G/DL — SIGNIFICANT CHANGE UP (ref 6.6–8.7)
PROT UR-MCNC: 100 MG/DL
RAPID RVP RESULT: SIGNIFICANT CHANGE UP
RBC # BLD: 5.39 M/UL — SIGNIFICANT CHANGE UP (ref 4.2–5.8)
RBC # FLD: 12 % — SIGNIFICANT CHANGE UP (ref 10.3–14.5)
RBC CASTS # UR COMP ASSIST: SIGNIFICANT CHANGE UP /HPF (ref 0–4)
SAO2 % BLDV: 100 % — SIGNIFICANT CHANGE UP
SARS-COV-2 RNA SPEC QL NAA+PROBE: SIGNIFICANT CHANGE UP
SODIUM SERPL-SCNC: 134 MMOL/L — LOW (ref 135–145)
SODIUM SERPL-SCNC: 135 MMOL/L — SIGNIFICANT CHANGE UP (ref 135–145)
SODIUM SERPL-SCNC: 141 MMOL/L — SIGNIFICANT CHANGE UP (ref 135–145)
SP GR SPEC: 1.03 — HIGH (ref 1.01–1.02)
T3 SERPL-MCNC: 81 NG/DL — SIGNIFICANT CHANGE UP (ref 80–200)
T4 AB SER-ACNC: 9.2 UG/DL — SIGNIFICANT CHANGE UP (ref 4.5–12)
T4 FREE SERPL-MCNC: 1.5 NG/DL — SIGNIFICANT CHANGE UP (ref 0.9–1.8)
TROPONIN T SERPL-MCNC: <0.01 NG/ML — SIGNIFICANT CHANGE UP (ref 0–0.06)
TSH SERPL-MCNC: 1.06 UIU/ML — SIGNIFICANT CHANGE UP (ref 0.27–4.2)
UROBILINOGEN FLD QL: 1 MG/DL
WBC # BLD: 8.82 K/UL — SIGNIFICANT CHANGE UP (ref 3.8–10.5)
WBC # FLD AUTO: 8.82 K/UL — SIGNIFICANT CHANGE UP (ref 3.8–10.5)
WBC UR QL: SIGNIFICANT CHANGE UP /HPF (ref 0–5)

## 2023-08-03 PROCEDURE — 87086 URINE CULTURE/COLONY COUNT: CPT

## 2023-08-03 PROCEDURE — 82803 BLOOD GASES ANY COMBINATION: CPT

## 2023-08-03 PROCEDURE — 81001 URINALYSIS AUTO W/SCOPE: CPT

## 2023-08-03 PROCEDURE — 93010 ELECTROCARDIOGRAM REPORT: CPT

## 2023-08-03 PROCEDURE — 36415 COLL VENOUS BLD VENIPUNCTURE: CPT

## 2023-08-03 PROCEDURE — 84436 ASSAY OF TOTAL THYROXINE: CPT

## 2023-08-03 PROCEDURE — 85018 HEMOGLOBIN: CPT

## 2023-08-03 PROCEDURE — 84439 ASSAY OF FREE THYROXINE: CPT

## 2023-08-03 PROCEDURE — 80053 COMPREHEN METABOLIC PANEL: CPT

## 2023-08-03 PROCEDURE — 83036 HEMOGLOBIN GLYCOSYLATED A1C: CPT

## 2023-08-03 PROCEDURE — 82962 GLUCOSE BLOOD TEST: CPT

## 2023-08-03 PROCEDURE — 85014 HEMATOCRIT: CPT

## 2023-08-03 PROCEDURE — 80048 BASIC METABOLIC PNL TOTAL CA: CPT

## 2023-08-03 PROCEDURE — 82435 ASSAY OF BLOOD CHLORIDE: CPT

## 2023-08-03 PROCEDURE — 82947 ASSAY GLUCOSE BLOOD QUANT: CPT

## 2023-08-03 PROCEDURE — 84295 ASSAY OF SERUM SODIUM: CPT

## 2023-08-03 PROCEDURE — 83605 ASSAY OF LACTIC ACID: CPT

## 2023-08-03 PROCEDURE — 96375 TX/PRO/DX INJ NEW DRUG ADDON: CPT

## 2023-08-03 PROCEDURE — 96374 THER/PROPH/DIAG INJ IV PUSH: CPT

## 2023-08-03 PROCEDURE — 84443 ASSAY THYROID STIM HORMONE: CPT

## 2023-08-03 PROCEDURE — 87798 DETECT AGENT NOS DNA AMP: CPT

## 2023-08-03 PROCEDURE — 99285 EMERGENCY DEPT VISIT HI MDM: CPT

## 2023-08-03 PROCEDURE — 84480 ASSAY TRIIODOTHYRONINE (T3): CPT

## 2023-08-03 PROCEDURE — 82010 KETONE BODYS QUAN: CPT

## 2023-08-03 PROCEDURE — 84132 ASSAY OF SERUM POTASSIUM: CPT

## 2023-08-03 PROCEDURE — 93005 ELECTROCARDIOGRAM TRACING: CPT

## 2023-08-03 PROCEDURE — 0225U NFCT DS DNA&RNA 21 SARSCOV2: CPT

## 2023-08-03 PROCEDURE — 85025 COMPLETE CBC W/AUTO DIFF WBC: CPT

## 2023-08-03 PROCEDURE — 84484 ASSAY OF TROPONIN QUANT: CPT

## 2023-08-03 PROCEDURE — 99284 EMERGENCY DEPT VISIT MOD MDM: CPT | Mod: 25

## 2023-08-03 PROCEDURE — 82330 ASSAY OF CALCIUM: CPT

## 2023-08-03 RX ORDER — SODIUM ZIRCONIUM CYCLOSILICATE 10 G/10G
10 POWDER, FOR SUSPENSION ORAL ONCE
Refills: 0 | Status: COMPLETED | OUTPATIENT
Start: 2023-08-03 | End: 2023-08-03

## 2023-08-03 RX ORDER — SODIUM CHLORIDE 9 MG/ML
1000 INJECTION INTRAMUSCULAR; INTRAVENOUS; SUBCUTANEOUS ONCE
Refills: 0 | Status: COMPLETED | OUTPATIENT
Start: 2023-08-03 | End: 2023-08-03

## 2023-08-03 RX ORDER — DEXTROSE 50 % IN WATER 50 %
50 SYRINGE (ML) INTRAVENOUS ONCE
Refills: 0 | Status: COMPLETED | OUTPATIENT
Start: 2023-08-03 | End: 2023-08-03

## 2023-08-03 RX ORDER — CALCIUM GLUCONATE 100 MG/ML
2 VIAL (ML) INTRAVENOUS ONCE
Refills: 0 | Status: COMPLETED | OUTPATIENT
Start: 2023-08-03 | End: 2023-08-03

## 2023-08-03 RX ORDER — INSULIN HUMAN 100 [IU]/ML
5 INJECTION, SOLUTION SUBCUTANEOUS ONCE
Refills: 0 | Status: COMPLETED | OUTPATIENT
Start: 2023-08-03 | End: 2023-08-03

## 2023-08-03 RX ADMIN — Medication 50 MILLILITER(S): at 18:06

## 2023-08-03 RX ADMIN — Medication 200 GRAM(S): at 20:00

## 2023-08-03 RX ADMIN — INSULIN HUMAN 5 UNIT(S): 100 INJECTION, SOLUTION SUBCUTANEOUS at 18:07

## 2023-08-03 RX ADMIN — SODIUM CHLORIDE 1000 MILLILITER(S): 9 INJECTION INTRAMUSCULAR; INTRAVENOUS; SUBCUTANEOUS at 14:38

## 2023-08-03 RX ADMIN — SODIUM CHLORIDE 1000 MILLILITER(S): 9 INJECTION INTRAMUSCULAR; INTRAVENOUS; SUBCUTANEOUS at 16:35

## 2023-08-03 RX ADMIN — SODIUM ZIRCONIUM CYCLOSILICATE 10 GRAM(S): 10 POWDER, FOR SUSPENSION ORAL at 18:06

## 2023-08-03 RX ADMIN — SODIUM CHLORIDE 2000 MILLILITER(S): 9 INJECTION INTRAMUSCULAR; INTRAVENOUS; SUBCUTANEOUS at 13:38

## 2023-08-03 NOTE — ED STATDOCS - PROGRESS NOTE DETAILS
pt feeling better at this time. pt reports one week of diarrhea leading up to increased weakness that lead him to emergency department.  Pt reassessed, pt feeling better at this time, vss, pt able to walk, talk and vocalized plan of action. Discussed in depth and explained to pt in depth the next steps that need to be taken including proper follow up with PCP or specialists. All incidental findings were discussed with pt as well. Pt verbalized their concerns and all questions were answered. Pt understands dispo and wants discharge. Given good instructions when to return to ED, strict return precautions and importance of f/u.

## 2023-08-03 NOTE — ED STATDOCS - CARE PROVIDER_API CALL
Michelle Monique  Endocrinology/Metab/Diabetes  [address] [address]  Phone: [phone]  Fax: [phone]  Follow Up Time:

## 2023-08-03 NOTE — ED STATDOCS - PATIENT PORTAL LINK FT
You can access the FollowMyHealth Patient Portal offered by [facility] by registering at the following website: [email or web address]. By joining [employer]’s FollowMyHealth portal, you will also be able to view your health information using other applications (apps) compatible with our system.

## 2023-08-03 NOTE — ED STATDOCS - NSFOLLOWUPINSTRUCTIONS_ED_ALL_ED_FT
- Follow up with primary care  - follow up with endocrinology     Hyperglycemia    Hyperglycemia occurs when the glucose (sugar) level in your blood is too high. Symptoms include increased urination, increased thirst, a dry mouth, or changes in appetite. If started on a medication, take exactly as prescribed by your health care professional. Maintain a healthy lifestyle and follow up with your primary care physician.    SEEK IMMEDIATE MEDICAL CARE IF YOU HAVE ANY OF THE FOLLOWING SYMPTOMS: shortness of breath, change in mental status, nausea or vomiting, fruity smell to your breath, or any signs of dehydration.

## 2023-08-03 NOTE — ED STATDOCS - NS_ ATTENDINGSCRIBEDETAILS _ED_A_ED_FT
I performed the initial face to face bedside interview with this patient regarding history of present illness, review of symptoms and relevant past medical, social and family history.  I completed an independent physical examination.  I was the initial provider who evaluated this patient. I have signed out the follow up of any pending tests (i.e. labs, radiological studies) to the ACP/res.  I have communicated the patient’s plan of care and disposition with the ACP/res.

## 2023-08-03 NOTE — ED STATDOCS - CLINICAL SUMMARY MEDICAL DECISION MAKING FREE TEXT BOX
pt with fatigue, weight loss, weakness and multiple complainants. Unable to preform normal activities. Will check multiple labs, EKG, hydrate and reevaluate pt with fatigue, weight loss, weakness and multiple complainants. Unable to preform normal activities. Will check multiple labs, EKG, hydrate and reevaluate    hyperkalemia corrected, labs WNL    Pt reassessed, pt feeling better at this time, vss, pt able to walk, talk and vocalized plan of action. Discussed in depth and explained to pt in depth the next steps that need to be taken including proper follow up with PCP or specialists. All incidental findings were discussed with pt as well. Pt verbalized their concerns and all questions were answered. Pt understands dispo and wants discharge. Given good instructions when to return to ED, strict return precautions and importance of f/u.

## 2023-08-03 NOTE — ED STATDOCS - OBJECTIVE STATEMENT
41 y/o male with pmhx of DM taking berberine c/o generalized weakness and fatigue x1 week. Pt states feeling winded after walking. pt had subjective fever over the weekend (non currently) and decrease appetite. Wife reports pt had a pilonidal abscess that burst and was on doxycycline for 2 weeks. Pt also c/o diarrhea. Denies vision changes or extreme thirst. Pt noticed recent loss of 9 lbs. Denies abdominal pain.

## 2023-08-04 LAB
CULTURE RESULTS: SIGNIFICANT CHANGE UP
SPECIMEN SOURCE: SIGNIFICANT CHANGE UP

## 2023-08-05 LAB
A PHAGOCYTOPH DNA BLD QL NAA+PROBE: NEGATIVE — SIGNIFICANT CHANGE UP
B MICROTI DNA BLD QL NAA+PROBE: NEGATIVE — SIGNIFICANT CHANGE UP
B MIYAMOTOI GLPQ BLD QL NAA+NON-PROBE: NEGATIVE — SIGNIFICANT CHANGE UP
BABESIA DNA SPEC QL NAA+PROBE: NEGATIVE — SIGNIFICANT CHANGE UP
BABESIA DNA SPEC QL NAA+PROBE: NEGATIVE — SIGNIFICANT CHANGE UP
E CHAFFEENSIS DNA BLD QL NAA+PROBE: NEGATIVE — SIGNIFICANT CHANGE UP
E EWINGII DNA SPEC QL NAA+PROBE: NEGATIVE — SIGNIFICANT CHANGE UP
EHRLICHIA DNA SPEC QL NAA+PROBE: NEGATIVE — SIGNIFICANT CHANGE UP

## 2023-12-02 ENCOUNTER — EMERGENCY (EMERGENCY)
Facility: HOSPITAL | Age: 43
LOS: 1 days | Discharge: HOME | End: 2023-12-02
Attending: STUDENT IN AN ORGANIZED HEALTH CARE EDUCATION/TRAINING PROGRAM
Payer: COMMERCIAL

## 2023-12-02 VITALS
WEIGHT: 214.51 LBS | OXYGEN SATURATION: 100 % | DIASTOLIC BLOOD PRESSURE: 95 MMHG | SYSTOLIC BLOOD PRESSURE: 157 MMHG | HEART RATE: 99 BPM | RESPIRATION RATE: 20 BRPM | TEMPERATURE: 98 F

## 2023-12-02 DIAGNOSIS — Z90.89 ACQUIRED ABSENCE OF OTHER ORGANS: Chronic | ICD-10-CM

## 2023-12-02 LAB
ALBUMIN SERPL ELPH-MCNC: 4.1 G/DL — SIGNIFICANT CHANGE UP (ref 3.3–5.2)
ALBUMIN SERPL ELPH-MCNC: 4.1 G/DL — SIGNIFICANT CHANGE UP (ref 3.3–5.2)
ALP SERPL-CCNC: 62 U/L — SIGNIFICANT CHANGE UP (ref 40–120)
ALP SERPL-CCNC: 62 U/L — SIGNIFICANT CHANGE UP (ref 40–120)
ALT FLD-CCNC: 12 U/L — SIGNIFICANT CHANGE UP
ALT FLD-CCNC: 12 U/L — SIGNIFICANT CHANGE UP
ANION GAP SERPL CALC-SCNC: 11 MMOL/L — SIGNIFICANT CHANGE UP (ref 5–17)
ANION GAP SERPL CALC-SCNC: 11 MMOL/L — SIGNIFICANT CHANGE UP (ref 5–17)
AST SERPL-CCNC: 29 U/L — SIGNIFICANT CHANGE UP
AST SERPL-CCNC: 29 U/L — SIGNIFICANT CHANGE UP
BASOPHILS # BLD AUTO: 0.04 K/UL — SIGNIFICANT CHANGE UP (ref 0–0.2)
BASOPHILS # BLD AUTO: 0.04 K/UL — SIGNIFICANT CHANGE UP (ref 0–0.2)
BASOPHILS NFR BLD AUTO: 0.4 % — SIGNIFICANT CHANGE UP (ref 0–2)
BASOPHILS NFR BLD AUTO: 0.4 % — SIGNIFICANT CHANGE UP (ref 0–2)
BILIRUB SERPL-MCNC: 0.3 MG/DL — LOW (ref 0.4–2)
BILIRUB SERPL-MCNC: 0.3 MG/DL — LOW (ref 0.4–2)
BUN SERPL-MCNC: 19.8 MG/DL — SIGNIFICANT CHANGE UP (ref 8–20)
BUN SERPL-MCNC: 19.8 MG/DL — SIGNIFICANT CHANGE UP (ref 8–20)
CALCIUM SERPL-MCNC: 9.2 MG/DL — SIGNIFICANT CHANGE UP (ref 8.4–10.5)
CALCIUM SERPL-MCNC: 9.2 MG/DL — SIGNIFICANT CHANGE UP (ref 8.4–10.5)
CHLORIDE SERPL-SCNC: 102 MMOL/L — SIGNIFICANT CHANGE UP (ref 96–108)
CHLORIDE SERPL-SCNC: 102 MMOL/L — SIGNIFICANT CHANGE UP (ref 96–108)
CO2 SERPL-SCNC: 25 MMOL/L — SIGNIFICANT CHANGE UP (ref 22–29)
CO2 SERPL-SCNC: 25 MMOL/L — SIGNIFICANT CHANGE UP (ref 22–29)
CREAT SERPL-MCNC: 0.8 MG/DL — SIGNIFICANT CHANGE UP (ref 0.5–1.3)
CREAT SERPL-MCNC: 0.8 MG/DL — SIGNIFICANT CHANGE UP (ref 0.5–1.3)
CRP SERPL-MCNC: 27 MG/L — HIGH
CRP SERPL-MCNC: 27 MG/L — HIGH
EGFR: 113 ML/MIN/1.73M2 — SIGNIFICANT CHANGE UP
EGFR: 113 ML/MIN/1.73M2 — SIGNIFICANT CHANGE UP
EOSINOPHIL # BLD AUTO: 0.09 K/UL — SIGNIFICANT CHANGE UP (ref 0–0.5)
EOSINOPHIL # BLD AUTO: 0.09 K/UL — SIGNIFICANT CHANGE UP (ref 0–0.5)
EOSINOPHIL NFR BLD AUTO: 0.8 % — SIGNIFICANT CHANGE UP (ref 0–6)
EOSINOPHIL NFR BLD AUTO: 0.8 % — SIGNIFICANT CHANGE UP (ref 0–6)
ERYTHROCYTE [SEDIMENTATION RATE] IN BLOOD: 32 MM/HR — HIGH (ref 0–15)
ERYTHROCYTE [SEDIMENTATION RATE] IN BLOOD: 32 MM/HR — HIGH (ref 0–15)
GLUCOSE SERPL-MCNC: 183 MG/DL — HIGH (ref 70–99)
GLUCOSE SERPL-MCNC: 183 MG/DL — HIGH (ref 70–99)
HCT VFR BLD CALC: 38.1 % — LOW (ref 39–50)
HCT VFR BLD CALC: 38.1 % — LOW (ref 39–50)
HGB BLD-MCNC: 13.5 G/DL — SIGNIFICANT CHANGE UP (ref 13–17)
HGB BLD-MCNC: 13.5 G/DL — SIGNIFICANT CHANGE UP (ref 13–17)
IMM GRANULOCYTES NFR BLD AUTO: 0.2 % — SIGNIFICANT CHANGE UP (ref 0–0.9)
IMM GRANULOCYTES NFR BLD AUTO: 0.2 % — SIGNIFICANT CHANGE UP (ref 0–0.9)
LYMPHOCYTES # BLD AUTO: 2.61 K/UL — SIGNIFICANT CHANGE UP (ref 1–3.3)
LYMPHOCYTES # BLD AUTO: 2.61 K/UL — SIGNIFICANT CHANGE UP (ref 1–3.3)
LYMPHOCYTES # BLD AUTO: 24.1 % — SIGNIFICANT CHANGE UP (ref 13–44)
LYMPHOCYTES # BLD AUTO: 24.1 % — SIGNIFICANT CHANGE UP (ref 13–44)
MCHC RBC-ENTMCNC: 30.5 PG — SIGNIFICANT CHANGE UP (ref 27–34)
MCHC RBC-ENTMCNC: 30.5 PG — SIGNIFICANT CHANGE UP (ref 27–34)
MCHC RBC-ENTMCNC: 35.4 GM/DL — SIGNIFICANT CHANGE UP (ref 32–36)
MCHC RBC-ENTMCNC: 35.4 GM/DL — SIGNIFICANT CHANGE UP (ref 32–36)
MCV RBC AUTO: 86 FL — SIGNIFICANT CHANGE UP (ref 80–100)
MCV RBC AUTO: 86 FL — SIGNIFICANT CHANGE UP (ref 80–100)
MONOCYTES # BLD AUTO: 0.7 K/UL — SIGNIFICANT CHANGE UP (ref 0–0.9)
MONOCYTES # BLD AUTO: 0.7 K/UL — SIGNIFICANT CHANGE UP (ref 0–0.9)
MONOCYTES NFR BLD AUTO: 6.5 % — SIGNIFICANT CHANGE UP (ref 2–14)
MONOCYTES NFR BLD AUTO: 6.5 % — SIGNIFICANT CHANGE UP (ref 2–14)
NEUTROPHILS # BLD AUTO: 7.39 K/UL — SIGNIFICANT CHANGE UP (ref 1.8–7.4)
NEUTROPHILS # BLD AUTO: 7.39 K/UL — SIGNIFICANT CHANGE UP (ref 1.8–7.4)
NEUTROPHILS NFR BLD AUTO: 68 % — SIGNIFICANT CHANGE UP (ref 43–77)
NEUTROPHILS NFR BLD AUTO: 68 % — SIGNIFICANT CHANGE UP (ref 43–77)
PLATELET # BLD AUTO: 216 K/UL — SIGNIFICANT CHANGE UP (ref 150–400)
PLATELET # BLD AUTO: 216 K/UL — SIGNIFICANT CHANGE UP (ref 150–400)
POTASSIUM SERPL-MCNC: 4.5 MMOL/L — SIGNIFICANT CHANGE UP (ref 3.5–5.3)
POTASSIUM SERPL-MCNC: 4.5 MMOL/L — SIGNIFICANT CHANGE UP (ref 3.5–5.3)
POTASSIUM SERPL-SCNC: 4.5 MMOL/L — SIGNIFICANT CHANGE UP (ref 3.5–5.3)
POTASSIUM SERPL-SCNC: 4.5 MMOL/L — SIGNIFICANT CHANGE UP (ref 3.5–5.3)
PROT SERPL-MCNC: 7.5 G/DL — SIGNIFICANT CHANGE UP (ref 6.6–8.7)
PROT SERPL-MCNC: 7.5 G/DL — SIGNIFICANT CHANGE UP (ref 6.6–8.7)
RBC # BLD: 4.43 M/UL — SIGNIFICANT CHANGE UP (ref 4.2–5.8)
RBC # BLD: 4.43 M/UL — SIGNIFICANT CHANGE UP (ref 4.2–5.8)
RBC # FLD: 12.6 % — SIGNIFICANT CHANGE UP (ref 10.3–14.5)
RBC # FLD: 12.6 % — SIGNIFICANT CHANGE UP (ref 10.3–14.5)
SODIUM SERPL-SCNC: 138 MMOL/L — SIGNIFICANT CHANGE UP (ref 135–145)
SODIUM SERPL-SCNC: 138 MMOL/L — SIGNIFICANT CHANGE UP (ref 135–145)
WBC # BLD: 10.85 K/UL — HIGH (ref 3.8–10.5)
WBC # BLD: 10.85 K/UL — HIGH (ref 3.8–10.5)
WBC # FLD AUTO: 10.85 K/UL — HIGH (ref 3.8–10.5)
WBC # FLD AUTO: 10.85 K/UL — HIGH (ref 3.8–10.5)

## 2023-12-02 PROCEDURE — 99285 EMERGENCY DEPT VISIT HI MDM: CPT

## 2023-12-02 PROCEDURE — 73630 X-RAY EXAM OF FOOT: CPT | Mod: 26,RT

## 2023-12-02 RX ORDER — SODIUM CHLORIDE 9 MG/ML
1000 INJECTION, SOLUTION INTRAVENOUS
Refills: 0 | Status: DISCONTINUED | OUTPATIENT
Start: 2023-12-02 | End: 2023-12-10

## 2023-12-02 RX ORDER — DEXTROSE 50 % IN WATER 50 %
25 SYRINGE (ML) INTRAVENOUS ONCE
Refills: 0 | Status: DISCONTINUED | OUTPATIENT
Start: 2023-12-02 | End: 2023-12-10

## 2023-12-02 RX ORDER — PIPERACILLIN AND TAZOBACTAM 4; .5 G/20ML; G/20ML
3.38 INJECTION, POWDER, LYOPHILIZED, FOR SOLUTION INTRAVENOUS ONCE
Refills: 0 | Status: COMPLETED | OUTPATIENT
Start: 2023-12-02 | End: 2023-12-02

## 2023-12-02 RX ORDER — DEXTROSE 50 % IN WATER 50 %
12.5 SYRINGE (ML) INTRAVENOUS ONCE
Refills: 0 | Status: DISCONTINUED | OUTPATIENT
Start: 2023-12-02 | End: 2023-12-10

## 2023-12-02 RX ORDER — DEXTROSE 50 % IN WATER 50 %
15 SYRINGE (ML) INTRAVENOUS ONCE
Refills: 0 | Status: DISCONTINUED | OUTPATIENT
Start: 2023-12-02 | End: 2023-12-10

## 2023-12-02 RX ORDER — INSULIN GLARGINE 100 [IU]/ML
14 INJECTION, SOLUTION SUBCUTANEOUS AT BEDTIME
Refills: 0 | Status: DISCONTINUED | OUTPATIENT
Start: 2023-12-02 | End: 2023-12-10

## 2023-12-02 RX ORDER — GLUCAGON INJECTION, SOLUTION 0.5 MG/.1ML
1 INJECTION, SOLUTION SUBCUTANEOUS ONCE
Refills: 0 | Status: DISCONTINUED | OUTPATIENT
Start: 2023-12-02 | End: 2023-12-10

## 2023-12-02 RX ADMIN — INSULIN GLARGINE 14 UNIT(S): 100 INJECTION, SOLUTION SUBCUTANEOUS at 22:34

## 2023-12-02 RX ADMIN — Medication 100 MILLIGRAM(S): at 23:03

## 2023-12-02 RX ADMIN — PIPERACILLIN AND TAZOBACTAM 200 GRAM(S): 4; .5 INJECTION, POWDER, LYOPHILIZED, FOR SOLUTION INTRAVENOUS at 22:28

## 2023-12-02 NOTE — ED PROVIDER NOTE - ATTENDING APP SHARED VISIT CONTRIBUTION OF CARE
Pt with diabetes presents with infection to foot  pe as docuumented  agree w pe  agree w lab AB and imaging  agree w nikunj and brittney

## 2023-12-02 NOTE — ED PROVIDER NOTE - CLINICAL SUMMARY MEDICAL DECISION MAKING FREE TEXT BOX
41 y/o male with hx of DM presents to the ED c/o right foot pain and cellulitis. cellulitis of dorsum of the foot, puncture wound to the plantar surface appears well. was on augmentin for 3 days. will give clinda and zosyn, labs, xray 43 y/o male with hx of DM presents to the ED c/o right foot pain and cellulitis. cellulitis of dorsum of the foot, puncture wound to the plantar surface appears well. was on augmentin for 3 days. will give clinda and zosyn, labs, xray 43 y/o male with hx of DM presents to the ED c/o right foot pain and cellulitis. cellulitis of dorsum of the foot, puncture wound to the plantar surface appears well. was on augmentin for 3 days. will give clinda and zosyn, labs, xray shows no acute evdience of osteo, mild elevated wbc and esr, crp, will place in obs for iv abx and podiatry consult, 41 y/o male with hx of DM presents to the ED c/o right foot pain and cellulitis. cellulitis of dorsum of the foot, puncture wound to the plantar surface appears well. was on augmentin for 3 days. will give clinda and zosyn, labs, xray shows no acute evdience of osteo, mild elevated wbc and esr, crp, will place in obs for iv abx and podiatry consult,

## 2023-12-02 NOTE — ED PROVIDER NOTE - ED STEMI HIDDEN
[address] [facility]  [facility] [address]            1814 Pooja Penn hide

## 2023-12-02 NOTE — ED PROVIDER NOTE - NS ED ATTENDING STATEMENT MOD
This was a shared visit with the JAYLYN. I reviewed and verified the documentation and independently performed the documented:

## 2023-12-02 NOTE — ED PROVIDER NOTE - PHYSICAL EXAMINATION
General-alert and oriented to person place and time, nontoxic appearing, pleasant cooperative, NAD  HEENT-normocephalic, atraumatic, NT to palp, EOMI, PERRLA, no conjunctival injections,   Cardio-s1,s2 present, regular rate and rhythm  Resp- talks in full sentences, symmetrical chest rise, CTA bilat, no evidence of wheezes, rhonchi noted  Abdomen- bowel sounds presnt in all 4 quadrants, soft, NT/ND, no guarding, no rebound tenderness  MSK- moves all extremities, FROM of the foot  Skin- puncture wound of the right plantar surface just below the 2nd and 3rd digit of the foot, approx 5 x 3 cm area of erythema on the dorsum of the foot   Pulses- 2+ DP/PT, < 2 sec cap refill  Back- nt to palp of cervical, thoracic, lumbar spine, nt to palp of paraspinal m., No CVA tenderness  Neuro- no focal deficits, sensation intact

## 2023-12-02 NOTE — ED PROVIDER NOTE - OBJECTIVE STATEMENT
41 y/o male with hx of DM presents to the ED c/o right foot pain and cellulitis. Pt notes approx 3 days ago he sustained a puncture wound of the right distal foot. Does not recall how it occurred thinks he may have stepped on something that went through his shoe. Pt was on augmentin for 3 days but notes worsening redness to the top of his foot. No fevers, no chills. Hx of DM but recently had hemoglobin a1c done which was 7.1.

## 2023-12-02 NOTE — ED PROVIDER NOTE - NS ED MD DISPO DIVISION OBS
[facility]

## 2023-12-03 VITALS
HEART RATE: 81 BPM | OXYGEN SATURATION: 99 % | RESPIRATION RATE: 18 BRPM | DIASTOLIC BLOOD PRESSURE: 92 MMHG | SYSTOLIC BLOOD PRESSURE: 148 MMHG | TEMPERATURE: 98 F

## 2023-12-03 LAB
BASOPHILS # BLD AUTO: 0.04 K/UL — SIGNIFICANT CHANGE UP (ref 0–0.2)
BASOPHILS # BLD AUTO: 0.04 K/UL — SIGNIFICANT CHANGE UP (ref 0–0.2)
BASOPHILS NFR BLD AUTO: 0.4 % — SIGNIFICANT CHANGE UP (ref 0–2)
BASOPHILS NFR BLD AUTO: 0.4 % — SIGNIFICANT CHANGE UP (ref 0–2)
EOSINOPHIL # BLD AUTO: 0.14 K/UL — SIGNIFICANT CHANGE UP (ref 0–0.5)
EOSINOPHIL # BLD AUTO: 0.14 K/UL — SIGNIFICANT CHANGE UP (ref 0–0.5)
EOSINOPHIL NFR BLD AUTO: 1.5 % — SIGNIFICANT CHANGE UP (ref 0–6)
EOSINOPHIL NFR BLD AUTO: 1.5 % — SIGNIFICANT CHANGE UP (ref 0–6)
GLUCOSE BLDC GLUCOMTR-MCNC: 154 MG/DL — HIGH (ref 70–99)
GLUCOSE BLDC GLUCOMTR-MCNC: 154 MG/DL — HIGH (ref 70–99)
GLUCOSE BLDC GLUCOMTR-MCNC: 160 MG/DL — HIGH (ref 70–99)
GLUCOSE BLDC GLUCOMTR-MCNC: 160 MG/DL — HIGH (ref 70–99)
HCT VFR BLD CALC: 37.6 % — LOW (ref 39–50)
HCT VFR BLD CALC: 37.6 % — LOW (ref 39–50)
HGB BLD-MCNC: 13.1 G/DL — SIGNIFICANT CHANGE UP (ref 13–17)
HGB BLD-MCNC: 13.1 G/DL — SIGNIFICANT CHANGE UP (ref 13–17)
IMM GRANULOCYTES NFR BLD AUTO: 0.2 % — SIGNIFICANT CHANGE UP (ref 0–0.9)
IMM GRANULOCYTES NFR BLD AUTO: 0.2 % — SIGNIFICANT CHANGE UP (ref 0–0.9)
LYMPHOCYTES # BLD AUTO: 2.45 K/UL — SIGNIFICANT CHANGE UP (ref 1–3.3)
LYMPHOCYTES # BLD AUTO: 2.45 K/UL — SIGNIFICANT CHANGE UP (ref 1–3.3)
LYMPHOCYTES # BLD AUTO: 26.9 % — SIGNIFICANT CHANGE UP (ref 13–44)
LYMPHOCYTES # BLD AUTO: 26.9 % — SIGNIFICANT CHANGE UP (ref 13–44)
MCHC RBC-ENTMCNC: 30 PG — SIGNIFICANT CHANGE UP (ref 27–34)
MCHC RBC-ENTMCNC: 30 PG — SIGNIFICANT CHANGE UP (ref 27–34)
MCHC RBC-ENTMCNC: 34.8 GM/DL — SIGNIFICANT CHANGE UP (ref 32–36)
MCHC RBC-ENTMCNC: 34.8 GM/DL — SIGNIFICANT CHANGE UP (ref 32–36)
MCV RBC AUTO: 86.2 FL — SIGNIFICANT CHANGE UP (ref 80–100)
MCV RBC AUTO: 86.2 FL — SIGNIFICANT CHANGE UP (ref 80–100)
MONOCYTES # BLD AUTO: 0.71 K/UL — SIGNIFICANT CHANGE UP (ref 0–0.9)
MONOCYTES # BLD AUTO: 0.71 K/UL — SIGNIFICANT CHANGE UP (ref 0–0.9)
MONOCYTES NFR BLD AUTO: 7.8 % — SIGNIFICANT CHANGE UP (ref 2–14)
MONOCYTES NFR BLD AUTO: 7.8 % — SIGNIFICANT CHANGE UP (ref 2–14)
NEUTROPHILS # BLD AUTO: 5.74 K/UL — SIGNIFICANT CHANGE UP (ref 1.8–7.4)
NEUTROPHILS # BLD AUTO: 5.74 K/UL — SIGNIFICANT CHANGE UP (ref 1.8–7.4)
NEUTROPHILS NFR BLD AUTO: 63.2 % — SIGNIFICANT CHANGE UP (ref 43–77)
NEUTROPHILS NFR BLD AUTO: 63.2 % — SIGNIFICANT CHANGE UP (ref 43–77)
PLATELET # BLD AUTO: 192 K/UL — SIGNIFICANT CHANGE UP (ref 150–400)
PLATELET # BLD AUTO: 192 K/UL — SIGNIFICANT CHANGE UP (ref 150–400)
RBC # BLD: 4.36 M/UL — SIGNIFICANT CHANGE UP (ref 4.2–5.8)
RBC # BLD: 4.36 M/UL — SIGNIFICANT CHANGE UP (ref 4.2–5.8)
RBC # FLD: 12.5 % — SIGNIFICANT CHANGE UP (ref 10.3–14.5)
RBC # FLD: 12.5 % — SIGNIFICANT CHANGE UP (ref 10.3–14.5)
WBC # BLD: 9.1 K/UL — SIGNIFICANT CHANGE UP (ref 3.8–10.5)
WBC # BLD: 9.1 K/UL — SIGNIFICANT CHANGE UP (ref 3.8–10.5)
WBC # FLD AUTO: 9.1 K/UL — SIGNIFICANT CHANGE UP (ref 3.8–10.5)
WBC # FLD AUTO: 9.1 K/UL — SIGNIFICANT CHANGE UP (ref 3.8–10.5)

## 2023-12-03 PROCEDURE — 86140 C-REACTIVE PROTEIN: CPT

## 2023-12-03 PROCEDURE — 82962 GLUCOSE BLOOD TEST: CPT

## 2023-12-03 PROCEDURE — 96366 THER/PROPH/DIAG IV INF ADDON: CPT

## 2023-12-03 PROCEDURE — 85652 RBC SED RATE AUTOMATED: CPT

## 2023-12-03 PROCEDURE — 87070 CULTURE OTHR SPECIMN AEROBIC: CPT

## 2023-12-03 PROCEDURE — 96376 TX/PRO/DX INJ SAME DRUG ADON: CPT

## 2023-12-03 PROCEDURE — 87077 CULTURE AEROBIC IDENTIFY: CPT

## 2023-12-03 PROCEDURE — 96365 THER/PROPH/DIAG IV INF INIT: CPT

## 2023-12-03 PROCEDURE — G0378: CPT

## 2023-12-03 PROCEDURE — 36415 COLL VENOUS BLD VENIPUNCTURE: CPT

## 2023-12-03 PROCEDURE — 73630 X-RAY EXAM OF FOOT: CPT

## 2023-12-03 PROCEDURE — 80053 COMPREHEN METABOLIC PANEL: CPT

## 2023-12-03 PROCEDURE — 99236 HOSP IP/OBS SAME DATE HI 85: CPT

## 2023-12-03 PROCEDURE — 96367 TX/PROPH/DG ADDL SEQ IV INF: CPT

## 2023-12-03 PROCEDURE — 85025 COMPLETE CBC W/AUTO DIFF WBC: CPT

## 2023-12-03 PROCEDURE — 87186 SC STD MICRODIL/AGAR DIL: CPT

## 2023-12-03 PROCEDURE — 99284 EMERGENCY DEPT VISIT MOD MDM: CPT | Mod: 25

## 2023-12-03 RX ORDER — TETANUS TOXOID, REDUCED DIPHTHERIA TOXOID AND ACELLULAR PERTUSSIS VACCINE, ADSORBED 5; 2.5; 8; 8; 2.5 [IU]/.5ML; [IU]/.5ML; UG/.5ML; UG/.5ML; UG/.5ML
0.5 SUSPENSION INTRAMUSCULAR ONCE
Refills: 0 | Status: DISCONTINUED | OUTPATIENT
Start: 2023-12-03 | End: 2023-12-10

## 2023-12-03 RX ORDER — PIPERACILLIN AND TAZOBACTAM 4; .5 G/20ML; G/20ML
3.38 INJECTION, POWDER, LYOPHILIZED, FOR SOLUTION INTRAVENOUS EVERY 8 HOURS
Refills: 0 | Status: DISCONTINUED | OUTPATIENT
Start: 2023-12-03 | End: 2023-12-10

## 2023-12-03 RX ORDER — INSULIN LISPRO 100/ML
VIAL (ML) SUBCUTANEOUS
Refills: 0 | Status: DISCONTINUED | OUTPATIENT
Start: 2023-12-03 | End: 2023-12-10

## 2023-12-03 RX ORDER — INSULIN LISPRO 100/ML
2 VIAL (ML) SUBCUTANEOUS ONCE
Refills: 0 | Status: COMPLETED | OUTPATIENT
Start: 2023-12-03 | End: 2023-12-03

## 2023-12-03 RX ADMIN — PIPERACILLIN AND TAZOBACTAM 25 GRAM(S): 4; .5 INJECTION, POWDER, LYOPHILIZED, FOR SOLUTION INTRAVENOUS at 06:33

## 2023-12-03 RX ADMIN — Medication 100 MILLIGRAM(S): at 05:26

## 2023-12-03 RX ADMIN — Medication 2: at 12:35

## 2023-12-03 RX ADMIN — PIPERACILLIN AND TAZOBACTAM 3.38 GRAM(S): 4; .5 INJECTION, POWDER, LYOPHILIZED, FOR SOLUTION INTRAVENOUS at 10:30

## 2023-12-03 RX ADMIN — Medication 600 MILLIGRAM(S): at 06:00

## 2023-12-03 NOTE — ED ADULT NURSE REASSESSMENT NOTE - NS ED NURSE REASSESS COMMENT FT1
Assumed care of pt at 19:15 as stated in report from RN jovan pineda. Charting as noted. Patient A&O x4, denies pain/discomfort, denies CP/SOB. Updated on the plan of care. Call bell within reach, bed locked in lowest position. IV site flushed w/ NS. No redness, swelling or pain noted to site. No signs of acute distress noted, safety maintained.
Assumed care of patient at 730, alert and oriented x4, resting comfortably in bed, no s/s of distress noted. PT refusing to eat breakfast, upset he is still here waiting for podiatry. Plan of care discussed with patient. PA and MD at bedside to speak with him. Right foot boot noted. PT reports hes had a hx of this in the past in same location, and since then has been very on top of his diabetes. right foot erythema noted, no erythema noted pass markings. Will continue to monitor.

## 2023-12-03 NOTE — CONSULT NOTE ADULT - ASSESSMENT
A:   Right foot puncture wound   Right foot Cellulitis    P:   Pt was evaluated, chart reviewed   Xrays reviewed, pending final read   Cx taken   No drainage on manual manipulation   Irrigated with saline, applied DSD  Patient to do daily dressing changes at home   Recommend  keep wound clean and dry   Recommend d/c on Doxycycline   ED precaution given   F/u with Dr. Vallejo   D/w attending Dr. Vallejo

## 2023-12-03 NOTE — ED CDU PROVIDER DISPOSITION NOTE - ATTENDING CONTRIBUTION TO CARE
42M presenting for evaluation of foot cellulitis s/p puncture wound to base of foot, on exam celulitis is on dorsal aspect of foot which was unexpected, though appears to be improving based on marked off area, seen by podiatry and cleared for outpt follow up, patient happy with plan for d/c today, will d/c with return precautions for any worsening.

## 2023-12-03 NOTE — ED CDU PROVIDER DISPOSITION NOTE - NSFOLLOWUPINSTRUCTIONS_ED_ALL_ED_FT
n/a
- Return to the ED for any new or worsening symptoms.   - Follow-up with Dr. Vallejo in office this week  - Please complete course of Doxycycline as directed  - Keep area clean and dry    Cellulitis    Cellulitis is a skin infection caused by bacteria. This condition occurs most often in the arms and lower legs but can occur anywhere over the body. Symptoms include redness, swelling, warm skin, tenderness, and chills/fever. If you were prescribed an antibiotic medicine, take it as told by your health care provider. Do not stop taking the antibiotic even if you start to feel better.    SEEK IMMEDIATE MEDICAL CARE IF YOU HAVE ANY OF THE FOLLOWING SYMPTOMS: worsening fever, red streaks coming from affected area, vomiting or diarrhea, or dizziness/lightheadedness.

## 2023-12-03 NOTE — ED CDU PROVIDER INITIAL DAY NOTE - OBJECTIVE STATEMENT
41 y/o male with hx of DM presents to the ED c/o right foot pain and cellulitis. Pt notes approx 3 days ago he sustained a puncture wound of the right distal foot. Does not recall how it occurred thinks he may have stepped on something that went through his shoe. Pt was on augmentin for 3 days but notes worsening redness to the top of his foot. No fevers, no chills. Hx of DM but recently had hemoglobin a1c done which was 7.1. 43 y/o male with hx of DM presents to the ED c/o right foot pain and cellulitis. Pt notes approx 3 days ago he sustained a puncture wound of the right distal foot. Does not recall how it occurred thinks he may have stepped on something that went through his shoe. Pt was on augmentin for 3 days but notes worsening redness to the top of his foot. No fevers, no chills. Hx of DM but recently had hemoglobin a1c done which was 7.1.

## 2023-12-03 NOTE — ED CDU PROVIDER DISPOSITION NOTE - CARE PROVIDER_API CALL
Nickolas Vallejo  Podiatric Medicine  [address] [place] [id number]  Phone: [phone]  Fax: [phone]  Follow Up Time:    Nickolas Vallejo  Podiatric Medicine  [address] [place] [id number]  Phone: [phone]  Fax: [phone]  Follow Up Time:    Nickolas Vallejo  Podiatric Medicine  [address] [place] [id number]  Phone: [phone]  Fax: [phone]  Follow Up Time:

## 2023-12-03 NOTE — CONSULT NOTE ADULT - SUBJECTIVE AND OBJECTIVE BOX
Patient is a 42y old  Male who presents with a chief complaint of     HPI: 42M presents to the ED for right foot puncture wound sustained 3 days ago by stepping on a nail. Patient states he works as a . Patient states he went to Urgent care and was directed to visit podiatrist. Podiatrist placed patient on Augmentin Patient states he took it for one day and noticed worsening swelling and redness to right foot. Denies constitutional symptoms Denies calf pain. Patient has been ambulating in a surgical shoe without dressings. States his recent A1c was 7.1%. Well controlled on insulin. Wife was bedside.       Podiatry HPI:    PMH:Diabetes      Allergies: No Known Allergies    Medications: clindamycin IVPB      clindamycin IVPB 600 milliGRAM(s) IV Intermittent every 8 hours  dextrose 5%. 1000 milliLiter(s) IV Continuous <Continuous>  dextrose 5%. 1000 milliLiter(s) IV Continuous <Continuous>  dextrose 50% Injectable 25 Gram(s) IV Push once  dextrose 50% Injectable 12.5 Gram(s) IV Push once  dextrose 50% Injectable 25 Gram(s) IV Push once  dextrose Oral Gel 15 Gram(s) Oral once PRN  glucagon  Injectable 1 milliGRAM(s) IntraMuscular once  insulin glargine Injectable (LANTUS) 14 Unit(s) SubCutaneous at bedtime  insulin lispro (ADMELOG) corrective regimen sliding scale   SubCutaneous three times a day before meals  piperacillin/tazobactam IVPB.. 3.375 Gram(s) IV Intermittent every 8 hours    FH:Family history of diabetes mellitus (DM)      PSX: S/P tonsillectomy      SH: Social History:      Vital Signs Last 24 Hrs  T(C): 36.7 (03 Dec 2023 11:57), Max: 36.7 (02 Dec 2023 21:37)  T(F): 98.1 (03 Dec 2023 11:57), Max: 98.1 (03 Dec 2023 11:57)  HR: 78 (03 Dec 2023 11:57) (78 - 99)  BP: 157/90 (03 Dec 2023 11:57) (135/78 - 168/99)  BP(mean): --  RR: 19 (03 Dec 2023 11:57) (18 - 20)  SpO2: 98% (03 Dec 2023 11:57) (98% - 100%)    Parameters below as of 03 Dec 2023 11:57  Patient On (Oxygen Delivery Method): room air        LABS                        13.1   9.10  )-----------( 192      ( 03 Dec 2023 08:00 )             37.6               12-02    138  |  102  |  19.8  ----------------------------<  183<H>  4.5   |  25.0  |  0.80    Ca    9.2      02 Dec 2023 22:10    TPro  7.5  /  Alb  4.1  /  TBili  0.3<L>  /  DBili  x   /  AST  29  /  ALT  12  /  AlkPhos  62  12-02     WBC Count: 9.10 K/uL (12-03-23 @ 08:00)  WBC Count: 10.85 K/uL (12-02-23 @ 22:10)  Sedimentation Rate, Erythrocyte: 32 mm/hr (12-02-23 @ 22:10)  C-Reactive Protein, Serum: 27 mg/L (12-02-23 @ 22:10)      ROS  REVIEW OF SYSTEMS: Negative unless stated otherwise in the HPI    PHYSICAL EXAM  GEN: GABRIEL SCHMITD is a 42y Male in no acute distress, alert awake, and oriented to person, place and time.   LE Focused:    Vasc:  DP/PT 2/4, CFT intact  Derm: Right foot plantar submet 2 puncture wound noted measuring 4cbq3vyr.3cm, Probed to bone, dorsal edema and erythema extending to midfoot, no drainage, no malodor   Neuro: Protective sensation diminished   MSK: No pain on palpation of right calf     Imaging: Pending read    Cultures: Taken, sent to lab            Patient is a 42y old  Male who presents with a chief complaint of     HPI: 42M presents to the ED for right foot puncture wound sustained 3 days ago by stepping on a nail. Patient states he works as a . Patient states he went to Urgent care and was directed to visit podiatrist. Podiatrist placed patient on Augmentin Patient states he took it for one day and noticed worsening swelling and redness to right foot. Denies constitutional symptoms Denies calf pain. Patient has been ambulating in a surgical shoe without dressings. States his recent A1c was 7.1%. Well controlled on insulin. Wife was bedside.       Podiatry HPI:    PMH:Diabetes      Allergies: No Known Allergies    Medications: clindamycin IVPB      clindamycin IVPB 600 milliGRAM(s) IV Intermittent every 8 hours  dextrose 5%. 1000 milliLiter(s) IV Continuous <Continuous>  dextrose 5%. 1000 milliLiter(s) IV Continuous <Continuous>  dextrose 50% Injectable 25 Gram(s) IV Push once  dextrose 50% Injectable 12.5 Gram(s) IV Push once  dextrose 50% Injectable 25 Gram(s) IV Push once  dextrose Oral Gel 15 Gram(s) Oral once PRN  glucagon  Injectable 1 milliGRAM(s) IntraMuscular once  insulin glargine Injectable (LANTUS) 14 Unit(s) SubCutaneous at bedtime  insulin lispro (ADMELOG) corrective regimen sliding scale   SubCutaneous three times a day before meals  piperacillin/tazobactam IVPB.. 3.375 Gram(s) IV Intermittent every 8 hours    FH:Family history of diabetes mellitus (DM)      PSX: S/P tonsillectomy      SH: Social History:      Vital Signs Last 24 Hrs  T(C): 36.7 (03 Dec 2023 11:57), Max: 36.7 (02 Dec 2023 21:37)  T(F): 98.1 (03 Dec 2023 11:57), Max: 98.1 (03 Dec 2023 11:57)  HR: 78 (03 Dec 2023 11:57) (78 - 99)  BP: 157/90 (03 Dec 2023 11:57) (135/78 - 168/99)  BP(mean): --  RR: 19 (03 Dec 2023 11:57) (18 - 20)  SpO2: 98% (03 Dec 2023 11:57) (98% - 100%)    Parameters below as of 03 Dec 2023 11:57  Patient On (Oxygen Delivery Method): room air        LABS                        13.1   9.10  )-----------( 192      ( 03 Dec 2023 08:00 )             37.6               12-02    138  |  102  |  19.8  ----------------------------<  183<H>  4.5   |  25.0  |  0.80    Ca    9.2      02 Dec 2023 22:10    TPro  7.5  /  Alb  4.1  /  TBili  0.3<L>  /  DBili  x   /  AST  29  /  ALT  12  /  AlkPhos  62  12-02     WBC Count: 9.10 K/uL (12-03-23 @ 08:00)  WBC Count: 10.85 K/uL (12-02-23 @ 22:10)  Sedimentation Rate, Erythrocyte: 32 mm/hr (12-02-23 @ 22:10)  C-Reactive Protein, Serum: 27 mg/L (12-02-23 @ 22:10)      ROS  REVIEW OF SYSTEMS: Negative unless stated otherwise in the HPI    PHYSICAL EXAM  GEN: GABRIEL SCHMIDT is a 42y Male in no acute distress, alert awake, and oriented to person, place and time.   LE Focused:    Vasc:  DP/PT 2/4, CFT intact  Derm: Right foot plantar submet 2 puncture wound noted measuring 9lql3uko.3cm, Probed to bone, dorsal edema and erythema extending to midfoot, no drainage, no malodor   Neuro: Protective sensation diminished   MSK: No pain on palpation of right calf     Imaging: Pending read    Cultures: Taken, sent to lab            Patient is a 42y old  Male who presents with a chief complaint of     HPI: 42M presents to the ED for right foot puncture wound sustained 3 days ago by stepping on a nail. Patient states he works as a . Patient states he went to Urgent care and was directed to visit podiatrist. Podiatrist placed patient on Augmentin Patient states he took it for one day and noticed worsening swelling and redness to right foot. Denies constitutional symptoms Denies calf pain. Patient has been ambulating in a surgical shoe without dressings. States his recent A1c was 7.1%. Well controlled on insulin. Wife was bedside.       Podiatry HPI:    PMH:Diabetes      Allergies: No Known Allergies    Medications: clindamycin IVPB      clindamycin IVPB 600 milliGRAM(s) IV Intermittent every 8 hours  dextrose 5%. 1000 milliLiter(s) IV Continuous <Continuous>  dextrose 5%. 1000 milliLiter(s) IV Continuous <Continuous>  dextrose 50% Injectable 25 Gram(s) IV Push once  dextrose 50% Injectable 12.5 Gram(s) IV Push once  dextrose 50% Injectable 25 Gram(s) IV Push once  dextrose Oral Gel 15 Gram(s) Oral once PRN  glucagon  Injectable 1 milliGRAM(s) IntraMuscular once  insulin glargine Injectable (LANTUS) 14 Unit(s) SubCutaneous at bedtime  insulin lispro (ADMELOG) corrective regimen sliding scale   SubCutaneous three times a day before meals  piperacillin/tazobactam IVPB.. 3.375 Gram(s) IV Intermittent every 8 hours    FH:Family history of diabetes mellitus (DM)      PSX: S/P tonsillectomy      SH: Social History:      Vital Signs Last 24 Hrs  T(C): 36.7 (03 Dec 2023 11:57), Max: 36.7 (02 Dec 2023 21:37)  T(F): 98.1 (03 Dec 2023 11:57), Max: 98.1 (03 Dec 2023 11:57)  HR: 78 (03 Dec 2023 11:57) (78 - 99)  BP: 157/90 (03 Dec 2023 11:57) (135/78 - 168/99)  BP(mean): --  RR: 19 (03 Dec 2023 11:57) (18 - 20)  SpO2: 98% (03 Dec 2023 11:57) (98% - 100%)    Parameters below as of 03 Dec 2023 11:57  Patient On (Oxygen Delivery Method): room air        LABS                        13.1   9.10  )-----------( 192      ( 03 Dec 2023 08:00 )             37.6               12-02    138  |  102  |  19.8  ----------------------------<  183<H>  4.5   |  25.0  |  0.80    Ca    9.2      02 Dec 2023 22:10    TPro  7.5  /  Alb  4.1  /  TBili  0.3<L>  /  DBili  x   /  AST  29  /  ALT  12  /  AlkPhos  62  12-02     WBC Count: 9.10 K/uL (12-03-23 @ 08:00)  WBC Count: 10.85 K/uL (12-02-23 @ 22:10)  Sedimentation Rate, Erythrocyte: 32 mm/hr (12-02-23 @ 22:10)  C-Reactive Protein, Serum: 27 mg/L (12-02-23 @ 22:10)      ROS  REVIEW OF SYSTEMS: Negative unless stated otherwise in the HPI    PHYSICAL EXAM  GEN: GABRIEL SCHMIDT is a 42y Male in no acute distress, alert awake, and oriented to person, place and time.   LE Focused:    Vasc:  DP/PT 2/4, CFT intact  Derm: Right foot plantar submet 2 puncture wound noted measuring 0xvl3ker.3cm, Probed to bone, dorsal edema and erythema extending to midfoot, no drainage, no malodor   Neuro: Protective sensation diminished   MSK: No pain on palpation of right calf     Imaging: Pending read    Cultures: Taken, sent to lab

## 2023-12-03 NOTE — ED CDU PROVIDER DISPOSITION NOTE - CLINICAL COURSE
41yo M PMHx DMII presents to the ED c/o right foot pain and cellulitis. was on augmentin for 3 days prior to arrival. labs grossly unremarkable, leukocytosis 10.8k, xray shows no acute evidence of osteo, mild elevated esr, crp. Kept in obs on IV clinda and zosyn awaiting clinical improvement and podiatry consult. WBC improved 10.8 -> 9.1 on AM labs. Significant improvement in cellulitis on re-assessment today, erythema receded from markings. No fever/chills. Plantar wound swabbed by podiatry, recommending dc on doxycycline and outpt f/u in office this week. pt updated on tetanus prior to dc. rx for doxy sent to pharmacy. provided copy of all results. return precautions discussed 43yo M PMHx DMII presents to the ED c/o right foot pain and cellulitis. was on augmentin for 3 days prior to arrival. labs grossly unremarkable, leukocytosis 10.8k, xray shows no acute evidence of osteo, mild elevated esr, crp. Kept in obs on IV clinda and zosyn awaiting clinical improvement and podiatry consult. WBC improved 10.8 -> 9.1 on AM labs. Significant improvement in cellulitis on re-assessment today, erythema receded from markings. No fever/chills. Plantar wound swabbed by podiatry, recommending dc on doxycycline and outpt f/u in office this week. pt updated on tetanus prior to dc. rx for doxy sent to pharmacy. provided copy of all results. return precautions discussed

## 2023-12-03 NOTE — ED CDU PROVIDER DISPOSITION NOTE - PATIENT PORTAL LINK FT
You can access the FollowMyHealth Patient Portal offered by [facility] by registering at the following website: [email or web address]. By joining [employer]’s FollowMyHealth portal, you will also be able to view your health information using other applications (apps) compatible with our system. You can access the FollowMyHealth Patient Portal offered by [facility] by registering at the following website: [email or web address]. By joining [employer]’s FollowMyHealth portal, you will also be able to view your health information using other applications (apps) compatible with our system. You can access the FollowMyHealth Patient Portal offered by [facility] by registering at the following website: [email or web address]. By joining [employer]’s FollowMyHealth portal, you will also be able to view your health information using other applications (apps) compatible with our system.

## 2023-12-03 NOTE — ED ADULT NURSE NOTE - NSFALLUNIVINTERV_ED_ALL_ED
Bed/Stretcher in lowest position, wheels locked, appropriate side rails in place/Call bell, personal items and telephone in reach/Instruct patient to call for assistance before getting out of bed/chair/stretcher/Non-slip footwear applied when patient is off stretcher/[place] to call system/Physically safe environment - no spills, clutter or unnecessary equipment/Purposeful proactive rounding/Room/bathroom lighting operational, light cord in reach Bed/Stretcher in lowest position, wheels locked, appropriate side rails in place/Call bell, personal items and telephone in reach/Instruct patient to call for assistance before getting out of bed/chair/stretcher/Non-slip footwear applied when patient is off stretcher/[place] to call system/Physically safe environment - no spills, clutter or unnecessary equipment/Purposeful proactive rounding/Room/bathroom lighting operational, light cord in reach Bed/Stretcher in lowest position, wheels locked, appropriate side rails in place/Call bell, personal items and telephone in reach/Instruct patient to call for assistance before getting out of bed/chair/stretcher/Non-slip footwear applied when patient is off stretcher/[place] to call system/Physically safe environment - no spills, clutter or unnecessary equipment/Purposeful proactive rounding/Room/bathroom lighting operational, light cord in reach

## 2023-12-03 NOTE — ED ADULT NURSE NOTE - OBJECTIVE STATEMENT
Pt c/o of right foot swelling and redness about 3 days ago had a puncture wound. Type 2 DM on insulin. IV meds given. No c/o of pain and discomfort at this time

## 2023-12-03 NOTE — ED CDU PROVIDER DISPOSITION NOTE - PROVIDER TOKENS
PROVIDER:[TOKEN:[[id number]]] PROVIDER:[TOKEN:[[id number]]] PROVIDER:[TOKEN:[[id number]]]

## 2023-12-03 NOTE — ED ADULT NURSE REASSESSMENT NOTE - ED CARDIAC CAPILLARY REFILL
FUTURE VISIT INFORMATION      SURGERY INFORMATION:    Date: 9/10/20    [facility]    Surgeon:  Yanci Perry MD     Anesthesia Type:  general    Procedure: Removal of Nasal Implant, Rigid Nasal Endoscopy with Debridement     RECORDS REQUESTED FROM:       Primary Care Provider: Calixto Parker    Most recent EKG+ Tracin19     2 seconds or less

## 2023-12-03 NOTE — ED CDU PROVIDER INITIAL DAY NOTE - CLINICAL SUMMARY MEDICAL DECISION MAKING FREE TEXT BOX
41 y/o male with hx of DM presents to the ED c/o right foot pain and cellulitis. cellulitis of dorsum of the foot, puncture wound to the plantar surface appears well. was on augmentin for 3 days. will give clinda and zosyn, labs, xray shows no acute evdience of osteo, mild elevated wbc and esr, crp, will place in obs for iv abx and podiatry consult, 43 y/o male with hx of DM presents to the ED c/o right foot pain and cellulitis. cellulitis of dorsum of the foot, puncture wound to the plantar surface appears well. was on augmentin for 3 days. will give clinda and zosyn, labs, xray shows no acute evdience of osteo, mild elevated wbc and esr, crp, will place in obs for iv abx and podiatry consult,

## 2023-12-03 NOTE — ED CDU PROVIDER INITIAL DAY NOTE - ATTENDING APP SHARED VISIT CONTRIBUTION OF CARE
IV AB protocol  DM + foot infection/cellulitis  agree w pods consult in am and  iv AB  agree w care plan

## 2023-12-05 LAB
-  AMPICILLIN/SULBACTAM: SIGNIFICANT CHANGE UP
-  AMPICILLIN/SULBACTAM: SIGNIFICANT CHANGE UP
-  CEFAZOLIN: SIGNIFICANT CHANGE UP
-  CEFAZOLIN: SIGNIFICANT CHANGE UP
-  CLINDAMYCIN: SIGNIFICANT CHANGE UP
-  CLINDAMYCIN: SIGNIFICANT CHANGE UP
-  ERYTHROMYCIN: SIGNIFICANT CHANGE UP
-  ERYTHROMYCIN: SIGNIFICANT CHANGE UP
-  GENTAMICIN: SIGNIFICANT CHANGE UP
-  GENTAMICIN: SIGNIFICANT CHANGE UP
-  OXACILLIN: SIGNIFICANT CHANGE UP
-  OXACILLIN: SIGNIFICANT CHANGE UP
-  PENICILLIN: SIGNIFICANT CHANGE UP
-  PENICILLIN: SIGNIFICANT CHANGE UP
-  RIFAMPIN: SIGNIFICANT CHANGE UP
-  RIFAMPIN: SIGNIFICANT CHANGE UP
-  TETRACYCLINE: SIGNIFICANT CHANGE UP
-  TETRACYCLINE: SIGNIFICANT CHANGE UP
-  TRIMETHOPRIM/SULFAMETHOXAZOLE: SIGNIFICANT CHANGE UP
-  TRIMETHOPRIM/SULFAMETHOXAZOLE: SIGNIFICANT CHANGE UP
-  VANCOMYCIN: SIGNIFICANT CHANGE UP
-  VANCOMYCIN: SIGNIFICANT CHANGE UP
METHOD TYPE: SIGNIFICANT CHANGE UP
METHOD TYPE: SIGNIFICANT CHANGE UP

## 2023-12-08 LAB
CULTURE RESULTS: ABNORMAL
CULTURE RESULTS: ABNORMAL
ORGANISM # SPEC MICROSCOPIC CNT: ABNORMAL
ORGANISM # SPEC MICROSCOPIC CNT: ABNORMAL
ORGANISM # SPEC MICROSCOPIC CNT: SIGNIFICANT CHANGE UP
ORGANISM # SPEC MICROSCOPIC CNT: SIGNIFICANT CHANGE UP
SPECIMEN SOURCE: SIGNIFICANT CHANGE UP
SPECIMEN SOURCE: SIGNIFICANT CHANGE UP

## 2024-07-30 NOTE — ED CDU PROVIDER INITIAL DAY NOTE - ASSESSMENT PLAN
How Severe Are Your Spot(S)?: mild What Type Of Note Output Would You Prefer (Optional)?: Bullet Format What Is The Reason For Today's Visit?: Initial Full Body Skin Examination What Is The Reason For Today's Visit? (Being Monitored For X): the development of new lesions Additional History: Patient reports of red spots on the left side of his scalp. Antibiotic Administration

## 2024-10-02 NOTE — ED ADULT TRIAGE NOTE - NS ED NURSE BANDS TYPE
Render Risk Assessment In Note?: no Detail Level: Simple Additional Notes: Patient reports no side effects from minoxidil Name band;

## 2025-06-13 NOTE — ED CDU PROVIDER INITIAL DAY NOTE - PROGRESS NOTE DETAILS
pt evaluated on AM rounds, resting comfortably. erythema receding from border of skin markings. 13-Jun-2025 06:01

## 2025-07-03 NOTE — ED ADULT TRIAGE NOTE - ACCOMPANIED BY
Immunizations have been mailed. Pt notified and verbalized understanding      Spouse/Significant other